# Patient Record
Sex: FEMALE | Race: BLACK OR AFRICAN AMERICAN | NOT HISPANIC OR LATINO | Employment: OTHER | URBAN - METROPOLITAN AREA
[De-identification: names, ages, dates, MRNs, and addresses within clinical notes are randomized per-mention and may not be internally consistent; named-entity substitution may affect disease eponyms.]

---

## 2017-03-09 ENCOUNTER — AMBULATORY - HEALTHEAST (OUTPATIENT)
Dept: PALLIATIVE MEDICINE | Facility: OTHER | Age: 62
End: 2017-03-09

## 2021-06-01 ENCOUNTER — RECORDS - HEALTHEAST (OUTPATIENT)
Dept: ADMINISTRATIVE | Facility: CLINIC | Age: 66
End: 2021-06-01

## 2021-06-02 ENCOUNTER — RECORDS - HEALTHEAST (OUTPATIENT)
Dept: ADMINISTRATIVE | Facility: CLINIC | Age: 66
End: 2021-06-02

## 2021-06-05 ENCOUNTER — RECORDS - HEALTHEAST (OUTPATIENT)
Dept: PALLIATIVE MEDICINE | Facility: OTHER | Age: 66
End: 2021-06-05

## 2021-06-05 DIAGNOSIS — M19.90 OSTEOARTHROSIS: ICD-10-CM

## 2021-06-09 NOTE — PROGRESS NOTES
Discharge Summary      Dates of service 9/6/2016  to 3/9/2017 # Sessions completed: DA ONLY    Diagnosis at Intake  Adjustment disorder, with depressed mood  R/o Depression due to medical condition  R/O Major Depressive Disorder  Chronic Pain Syndrome        Diagnosis at Discharge  Unable to assess        Additional comments: Pt attended diagnostic assessment, did not schedule a follow up appointment.  Pt's file is being closed due to no contact in 3 months.       Reason for discharge:Did not follow up     Prognosis at discharge:unable toassess     Recommendations and referrals at discharge per diagnostic assessment: Encouraged patient to discuss concerns with primary care provider. Patient was discharged from opioid plan of care from [facility] pain clinic and explained to patient that the plan would probably not change. She is on wait list for a different pain clinic. Discussed the option of psychotherapy to help with coping with chronic pain symptoms, she stated she may be interested. Encouraged an increase in her recovery program (AA/NA meetings, sponsorship ect).        Sindy Grajeda      3/9/2017  11:17 AM

## 2024-11-21 ENCOUNTER — HOSPITAL ENCOUNTER (EMERGENCY)
Facility: CLINIC | Age: 69
Discharge: HOME | End: 2024-11-21
Attending: EMERGENCY MEDICINE
Payer: COMMERCIAL

## 2024-11-21 VITALS
TEMPERATURE: 97.4 F | BODY MASS INDEX: 26.68 KG/M2 | OXYGEN SATURATION: 98 % | WEIGHT: 170 LBS | SYSTOLIC BLOOD PRESSURE: 135 MMHG | HEIGHT: 67 IN | HEART RATE: 99 BPM | RESPIRATION RATE: 15 BRPM | DIASTOLIC BLOOD PRESSURE: 92 MMHG

## 2024-11-21 DIAGNOSIS — J06.9 VIRAL UPPER RESPIRATORY TRACT INFECTION: ICD-10-CM

## 2024-11-21 DIAGNOSIS — J02.9 PHARYNGITIS, UNSPECIFIED ETIOLOGY: ICD-10-CM

## 2024-11-21 LAB — GROUP A STREP BY PCR: NOT DETECTED

## 2024-11-21 PROCEDURE — 96372 THER/PROPH/DIAG INJ SC/IM: CPT | Performed by: EMERGENCY MEDICINE

## 2024-11-21 PROCEDURE — 250N000011 HC RX IP 250 OP 636: Performed by: EMERGENCY MEDICINE

## 2024-11-21 PROCEDURE — 87651 STREP A DNA AMP PROBE: CPT | Performed by: EMERGENCY MEDICINE

## 2024-11-21 PROCEDURE — 99284 EMERGENCY DEPT VISIT MOD MDM: CPT

## 2024-11-21 PROCEDURE — 250N000013 HC RX MED GY IP 250 OP 250 PS 637: Performed by: EMERGENCY MEDICINE

## 2024-11-21 PROCEDURE — 250N000009 HC RX 250: Performed by: EMERGENCY MEDICINE

## 2024-11-21 RX ORDER — KETOROLAC TROMETHAMINE 15 MG/ML
30 INJECTION, SOLUTION INTRAMUSCULAR; INTRAVENOUS ONCE
Status: COMPLETED | OUTPATIENT
Start: 2024-11-21 | End: 2024-11-21

## 2024-11-21 RX ORDER — DIPHENHYDRAMINE HYDROCHLORIDE AND LIDOCAINE HYDROCHLORIDE AND ALUMINUM HYDROXIDE AND MAGNESIUM HYDRO
10 KIT EVERY 6 HOURS PRN
Status: DISCONTINUED | OUTPATIENT
Start: 2024-11-21 | End: 2024-11-21 | Stop reason: HOSPADM

## 2024-11-21 RX ORDER — DEXAMETHASONE SODIUM PHOSPHATE 10 MG/ML
10 INJECTION, SOLUTION INTRAMUSCULAR; INTRAVENOUS ONCE
Status: COMPLETED | OUTPATIENT
Start: 2024-11-21 | End: 2024-11-21

## 2024-11-21 RX ADMIN — DIPHENHYDRAMINE HYDROCHLORIDE AND LIDOCAINE HYDROCHLORIDE AND ALUMINUM HYDROXIDE AND MAGNESIUM HYDRO 10 ML: KIT at 03:47

## 2024-11-21 RX ADMIN — DEXAMETHASONE SODIUM PHOSPHATE 10 MG: 10 INJECTION, SOLUTION INTRAMUSCULAR; INTRAVENOUS at 03:24

## 2024-11-21 RX ADMIN — KETOROLAC TROMETHAMINE 30 MG: 15 INJECTION, SOLUTION INTRAMUSCULAR; INTRAVENOUS at 03:24

## 2024-11-21 ASSESSMENT — ACTIVITIES OF DAILY LIVING (ADL)
ADLS_ACUITY_SCORE: 0
ADLS_ACUITY_SCORE: 0

## 2024-11-21 NOTE — ED TRIAGE NOTES
"Patient BIBA from a treatment facility (alcohol abuse hx) for eval of Pharyngitis and a non-productive cough. Pt states this has been going on for \"days\". No fever, chills, N/V/D. VSS. Of note, was seen at [facility] 11/18 and left before being re-eval by MD but RSV/COVID/ Influenza swab was negative.      Triage Assessment (Adult)       Row Name 11/21/24 0306          Triage Assessment    Airway WDL X  Pharyngitis symptoms, redness        Respiratory WDL    Respiratory WDL X;cough     Cough Frequency frequent     Cough Type nonproductive        Skin Circulation/Temperature WDL    Skin Circulation/Temperature WDL WDL        Cardiac WDL    Cardiac WDL X  Hx HTN        Peripheral/Neurovascular WDL    Peripheral Neurovascular WDL WDL        Cognitive/Neuro/Behavioral WDL    Cognitive/Neuro/Behavioral WDL WDL                     "

## 2024-11-21 NOTE — ED NOTES
Bed: ED20  Expected date:   Expected time:   Means of arrival:   Comments:  HEMS 415 69F sore throat, dry cough, stable, eta 0254

## 2024-11-21 NOTE — ED PROVIDER NOTES
"  Emergency Department Note      History of Present Illness     Chief Complaint   Pharyngitis and Cough      HPI   Kriss Vergara is a 69 year old female with a history of COPD, CVA, hyperlipidemia, hypertension, DM2, and polysubstance abuse who presents to the ED for pharyngitis and cough. The patient reports developing a sore throat and cough a couple of days ago. She describes her sore throat as being \"chocked to death\" and feels as though it is closed up. She further reports dysphagia and sleep loss due to her symptoms. She has been taking tylenol for her symptoms, the last ingestion being an hour ago. Patient was seen at [facility] ED recently for the same symptoms. Patient denies any fever, rhinorrhea, or congestion. She further denies any known allergies to medication.    Independent Historian   None    Review of External Notes   I reviewed 11/18/24 [facility] note for viral pharyngitis.    Past Medical History     Medical History and Problem List   Acute encephalopathy  COPD  Community acquired pneumonia  Chronic pain syndrome  Cocaine abuse, last use 7/11/19  Common bile duct dilation  Complete atrioventricular block  Cyst of right kidney  Left drooping eyelid  Heart block  CVA with residual deficit  Hyperlipidemia  Hypokalemia  Insomnia  Leukocytosis  Microalbuminuria  Onychomycosis  Neurosyphilis  DM2  CHACHA  Cervical disc degeneration  Lumbar disc degeneration  Chronic hepatitis C virus  MDD  Hypertension  Opioid dependence with continuous use  Methadone maintenance therapy patient  Polysubstance dependence  Osteoarthritis  Osteoarthrosis   Vitamin D deficiency  Tobacco dependence  Obesity    Medications   Advil/motrin  Tenormin  Lipitor  Cholecalciferol  Celexa  Flexeril  Hydrodiuril  Atarax  Cozaar  Adalat  Klor-con    Surgical History   Past Surgical History:   Procedure Laterality Date    IR CERVICAL EPIDURAL STEROID INJECTION  2/25/2011    IR LUMBAR EPIDURAL STEROID INJECTION  6/11/2009    IR " "LUMBAR PUNCTURE  12/6/2017    [facility] TOTAL ABDOM HYSTERECTOMY      Description: Hysterectomy;  Recorded: 11/14/2011;       Physical Exam     Patient Vitals for the past 24 hrs:   BP Temp Temp src Pulse Resp SpO2 Height Weight   11/21/24 0313 -- -- -- -- -- 98 % -- --   11/21/24 0300 (!) 135/92 -- -- 99 -- 99 % -- --   11/21/24 0257 -- 97.4  F (36.3  C) Oral -- 15 -- 1.702 m (5' 7\") 77.1 kg (170 lb)     Physical Exam  General: Does not appear in acute distress  Head: No signs of trauma.   Mouth/Throat: Oropharynx without swelling or exudate   Eyes: Conjunctivae are normal.   Neck: Normal range of motion. No nuchal rigidity.   CV: Normal rate and regular rhythm.    Resp: Effort normal and breath sounds normal. No respiratory distress.   GI: Soft. There is no tenderness.  No rebound or guarding.  Normal bowel sounds.  MSK: Normal range of motion. no edema. No Calf tenderness.  Neuro: The patient is alert and oriented.   Skin: Skin is warm and dry. No rash noted.   Psych: normal mood and affect. behavior is normal.       Diagnostics     Lab Results   Labs Ordered and Resulted from Time of ED Arrival to Time of ED Departure   GROUP A STREPTOCOCCUS PCR THROAT SWAB - Normal       Result Value    Group A strep by PCR Not Detected         Imaging   No orders to display     Independent Interpretation   None    ED Course      Medications Administered   Medications   magic mouthwash suspension (diphenhydramine, lidocaine, aluminum-magnesium & simethicone) (10 mLs Swish & Swallow $Given 11/21/24 0347)   ketorolac (TORADOL) injection 30 mg (30 mg Intramuscular $Given 11/21/24 0324)   dexAMETHasone (PF) (DECADRON) injectable solution used ORALLY 10 mg (10 mg Oral $Given 11/21/24 0324)       Procedures   Procedures     Discussion of Management   None    ED Course   ED Course as of 11/21/24 0553   Thu Nov 21, 2024   0308 I obtained history and examined the patient as noted above.     0406 I rechecked the patient and discussed " discharge.       Additional Documentation  None    Medical Decision Making / Diagnosis     CMS Diagnoses: None    MIPS       None    MDM   Kriss Vergara is a 69 year old female presents with sore throat, cough, and generally not feeling well.  She had been seen at [facility] for the same complaint and at that time had a reassuring chest x-ray and a negative COVID/influenza swab.  She continued have symptoms and return to the hospital.  On my evaluation she is was breathing comfortably in no respiratory distress.  She had appropriate vital signs and clear lung sounds.  I did obtain a strep swab and this was negative.  She was given the above medications and did feel better.  She is able to tolerate p.o. In this setting I felt that she was appropriate to be discharged back to her treatment facility with additional recommendations for supportive care for what sounds to be a viral process.    Disposition   The patient was discharged.     Diagnosis     ICD-10-CM    1. Pharyngitis, unspecified etiology  J02.9       2. Viral upper respiratory tract infection  J06.9            Discharge Medications   Discharge Medication List as of 11/21/2024  4:08 AM            Scribe Disclosure:  Mavis YUN, am serving as a scribe at 3:20 AM on 11/21/2024 to document services personally performed by Judson Collado MD based on my observations and the provider's statements to me.        Judson Collado MD  11/21/24 0545

## 2024-11-22 ENCOUNTER — HOSPITAL ENCOUNTER (INPATIENT)
Facility: CLINIC | Age: 69
LOS: 3 days | Discharge: HOME | DRG: 683 | End: 2024-11-25
Attending: EMERGENCY MEDICINE | Admitting: INTERNAL MEDICINE
Payer: COMMERCIAL

## 2024-11-22 ENCOUNTER — APPOINTMENT (OUTPATIENT)
Dept: GENERAL RADIOLOGY | Facility: CLINIC | Age: 69
DRG: 683 | End: 2024-11-22
Attending: EMERGENCY MEDICINE
Payer: COMMERCIAL

## 2024-11-22 DIAGNOSIS — R53.1 GENERALIZED WEAKNESS: ICD-10-CM

## 2024-11-22 DIAGNOSIS — E87.6 HYPOKALEMIA: ICD-10-CM

## 2024-11-22 DIAGNOSIS — F10.21 HISTORY OF ALCOHOLISM (H): ICD-10-CM

## 2024-11-22 DIAGNOSIS — M17.10 ARTHROPATHY, LOWER LEG: Primary | ICD-10-CM

## 2024-11-22 DIAGNOSIS — N28.9 ACUTE RENAL INSUFFICIENCY: ICD-10-CM

## 2024-11-22 DIAGNOSIS — J01.10 ACUTE FRONTAL SINUSITIS, RECURRENCE NOT SPECIFIED: ICD-10-CM

## 2024-11-22 LAB
ALBUMIN SERPL BCG-MCNC: 3.7 G/DL (ref 3.5–5.2)
ALBUMIN UR-MCNC: NEGATIVE MG/DL
ALP SERPL-CCNC: 91 U/L (ref 40–150)
ALT SERPL W P-5'-P-CCNC: 49 U/L (ref 0–50)
ANION GAP SERPL CALCULATED.3IONS-SCNC: 13 MMOL/L (ref 7–15)
ANION GAP SERPL CALCULATED.3IONS-SCNC: 14 MMOL/L (ref 7–15)
APPEARANCE UR: CLEAR
AST SERPL W P-5'-P-CCNC: 132 U/L (ref 0–45)
ATRIAL RATE - MUSE: 81 BPM
BASOPHILS # BLD AUTO: 0 10E3/UL (ref 0–0.2)
BASOPHILS NFR BLD AUTO: 0 %
BILIRUB SERPL-MCNC: 0.5 MG/DL
BILIRUB UR QL STRIP: NEGATIVE
BUN SERPL-MCNC: 21.5 MG/DL (ref 8–23)
BUN SERPL-MCNC: 23.4 MG/DL (ref 8–23)
CALCIUM SERPL-MCNC: 8.4 MG/DL (ref 8.8–10.4)
CALCIUM SERPL-MCNC: 8.8 MG/DL (ref 8.8–10.4)
CHLORIDE SERPL-SCNC: 95 MMOL/L (ref 98–107)
CHLORIDE SERPL-SCNC: 98 MMOL/L (ref 98–107)
COLOR UR AUTO: ABNORMAL
CREAT SERPL-MCNC: 1.68 MG/DL (ref 0.51–0.95)
CREAT SERPL-MCNC: 1.79 MG/DL (ref 0.51–0.95)
DIASTOLIC BLOOD PRESSURE - MUSE: NORMAL MMHG
EGFRCR SERPLBLD CKD-EPI 2021: 30 ML/MIN/1.73M2
EGFRCR SERPLBLD CKD-EPI 2021: 32 ML/MIN/1.73M2
EOSINOPHIL # BLD AUTO: 0 10E3/UL (ref 0–0.7)
EOSINOPHIL NFR BLD AUTO: 0 %
ERYTHROCYTE [DISTWIDTH] IN BLOOD BY AUTOMATED COUNT: 14.7 % (ref 10–15)
FLUAV RNA SPEC QL NAA+PROBE: NEGATIVE
FLUBV RNA RESP QL NAA+PROBE: NEGATIVE
GLUCOSE SERPL-MCNC: 108 MG/DL (ref 70–99)
GLUCOSE SERPL-MCNC: 120 MG/DL (ref 70–99)
GLUCOSE UR STRIP-MCNC: NEGATIVE MG/DL
HCO3 SERPL-SCNC: 22 MMOL/L (ref 22–29)
HCO3 SERPL-SCNC: 24 MMOL/L (ref 22–29)
HCT VFR BLD AUTO: 31.4 % (ref 35–47)
HGB BLD-MCNC: 10.5 G/DL (ref 11.7–15.7)
HGB UR QL STRIP: ABNORMAL
IMM GRANULOCYTES # BLD: 0.1 10E3/UL
IMM GRANULOCYTES NFR BLD: 1 %
INTERPRETATION ECG - MUSE: NORMAL
KETONES UR STRIP-MCNC: NEGATIVE MG/DL
LACTATE SERPL-SCNC: 1.7 MMOL/L (ref 0.7–2)
LEUKOCYTE ESTERASE UR QL STRIP: ABNORMAL
LYMPHOCYTES # BLD AUTO: 1.2 10E3/UL (ref 0.8–5.3)
LYMPHOCYTES NFR BLD AUTO: 12 %
MAGNESIUM SERPL-MCNC: 1.2 MG/DL (ref 1.7–2.3)
MCH RBC QN AUTO: 28.2 PG (ref 26.5–33)
MCHC RBC AUTO-ENTMCNC: 33.4 G/DL (ref 31.5–36.5)
MCV RBC AUTO: 84 FL (ref 78–100)
MONOCYTES # BLD AUTO: 0.5 10E3/UL (ref 0–1.3)
MONOCYTES NFR BLD AUTO: 5 %
NEUTROPHILS # BLD AUTO: 7.9 10E3/UL (ref 1.6–8.3)
NEUTROPHILS NFR BLD AUTO: 81 %
NITRATE UR QL: NEGATIVE
NRBC # BLD AUTO: 0 10E3/UL
NRBC BLD AUTO-RTO: 0 /100
P AXIS - MUSE: 37 DEGREES
PH UR STRIP: 5.5 [PH] (ref 5–7)
PHOSPHATE SERPL-MCNC: 2.2 MG/DL (ref 2.5–4.5)
PHOSPHATE SERPL-MCNC: 2.8 MG/DL (ref 2.5–4.5)
PLATELET # BLD AUTO: 123 10E3/UL (ref 150–450)
POTASSIUM SERPL-SCNC: 2.7 MMOL/L (ref 3.4–5.3)
POTASSIUM SERPL-SCNC: 3.2 MMOL/L (ref 3.4–5.3)
PR INTERVAL - MUSE: 174 MS
PROT SERPL-MCNC: 6.8 G/DL (ref 6.4–8.3)
QRS DURATION - MUSE: 160 MS
QT - MUSE: 434 MS
QTC - MUSE: 504 MS
R AXIS - MUSE: 2 DEGREES
RBC # BLD AUTO: 3.72 10E6/UL (ref 3.8–5.2)
RBC URINE: 1 /HPF
RSV RNA SPEC NAA+PROBE: NEGATIVE
SARS-COV-2 RNA RESP QL NAA+PROBE: NEGATIVE
SODIUM SERPL-SCNC: 132 MMOL/L (ref 135–145)
SODIUM SERPL-SCNC: 134 MMOL/L (ref 135–145)
SP GR UR STRIP: 1.01 (ref 1–1.03)
SQUAMOUS EPITHELIAL: 1 /HPF
SYSTOLIC BLOOD PRESSURE - MUSE: NORMAL MMHG
T AXIS - MUSE: 25 DEGREES
TROPONIN T SERPL HS-MCNC: 25 NG/L
TROPONIN T SERPL HS-MCNC: 27 NG/L
TROPONIN T SERPL HS-MCNC: 28 NG/L
TROPONIN T SERPL HS-MCNC: 29 NG/L
UROBILINOGEN UR STRIP-MCNC: NORMAL MG/DL
VENTRICULAR RATE- MUSE: 81 BPM
WBC # BLD AUTO: 9.7 10E3/UL (ref 4–11)
WBC URINE: 1 /HPF

## 2024-11-22 PROCEDURE — 99285 EMERGENCY DEPT VISIT HI MDM: CPT | Mod: 25

## 2024-11-22 PROCEDURE — 84100 ASSAY OF PHOSPHORUS: CPT | Performed by: PHYSICIAN ASSISTANT

## 2024-11-22 PROCEDURE — 84484 ASSAY OF TROPONIN QUANT: CPT | Performed by: EMERGENCY MEDICINE

## 2024-11-22 PROCEDURE — 71046 X-RAY EXAM CHEST 2 VIEWS: CPT

## 2024-11-22 PROCEDURE — 258N000003 HC RX IP 258 OP 636: Performed by: EMERGENCY MEDICINE

## 2024-11-22 PROCEDURE — 250N000011 HC RX IP 250 OP 636: Performed by: PHYSICIAN ASSISTANT

## 2024-11-22 PROCEDURE — HZ2ZZZZ DETOXIFICATION SERVICES FOR SUBSTANCE ABUSE TREATMENT: ICD-10-PCS | Performed by: PHYSICIAN ASSISTANT

## 2024-11-22 PROCEDURE — 99223 1ST HOSP IP/OBS HIGH 75: CPT | Performed by: PHYSICIAN ASSISTANT

## 2024-11-22 PROCEDURE — 81001 URINALYSIS AUTO W/SCOPE: CPT | Performed by: EMERGENCY MEDICINE

## 2024-11-22 PROCEDURE — 83735 ASSAY OF MAGNESIUM: CPT | Performed by: PHYSICIAN ASSISTANT

## 2024-11-22 PROCEDURE — 82435 ASSAY OF BLOOD CHLORIDE: CPT | Performed by: PHYSICIAN ASSISTANT

## 2024-11-22 PROCEDURE — 250N000013 HC RX MED GY IP 250 OP 250 PS 637: Performed by: PHYSICIAN ASSISTANT

## 2024-11-22 PROCEDURE — 120N000001 HC R&B MED SURG/OB

## 2024-11-22 PROCEDURE — 87637 SARSCOV2&INF A&B&RSV AMP PRB: CPT | Performed by: EMERGENCY MEDICINE

## 2024-11-22 PROCEDURE — 80053 COMPREHEN METABOLIC PANEL: CPT | Performed by: EMERGENCY MEDICINE

## 2024-11-22 PROCEDURE — 84484 ASSAY OF TROPONIN QUANT: CPT | Performed by: PHYSICIAN ASSISTANT

## 2024-11-22 PROCEDURE — 83605 ASSAY OF LACTIC ACID: CPT | Performed by: EMERGENCY MEDICINE

## 2024-11-22 PROCEDURE — 36415 COLL VENOUS BLD VENIPUNCTURE: CPT | Performed by: PHYSICIAN ASSISTANT

## 2024-11-22 PROCEDURE — 36415 COLL VENOUS BLD VENIPUNCTURE: CPT | Performed by: EMERGENCY MEDICINE

## 2024-11-22 PROCEDURE — 85025 COMPLETE CBC W/AUTO DIFF WBC: CPT | Performed by: EMERGENCY MEDICINE

## 2024-11-22 PROCEDURE — 258N000003 HC RX IP 258 OP 636: Performed by: PHYSICIAN ASSISTANT

## 2024-11-22 PROCEDURE — 93005 ELECTROCARDIOGRAM TRACING: CPT

## 2024-11-22 PROCEDURE — 82947 ASSAY GLUCOSE BLOOD QUANT: CPT | Performed by: PHYSICIAN ASSISTANT

## 2024-11-22 PROCEDURE — 82565 ASSAY OF CREATININE: CPT | Performed by: PHYSICIAN ASSISTANT

## 2024-11-22 PROCEDURE — 87086 URINE CULTURE/COLONY COUNT: CPT | Performed by: EMERGENCY MEDICINE

## 2024-11-22 RX ORDER — FOLIC ACID 1 MG/1
1 TABLET ORAL DAILY
Status: DISCONTINUED | OUTPATIENT
Start: 2024-11-22 | End: 2024-11-25 | Stop reason: HOSPADM

## 2024-11-22 RX ORDER — ONDANSETRON 2 MG/ML
4 INJECTION INTRAMUSCULAR; INTRAVENOUS EVERY 6 HOURS PRN
Status: DISCONTINUED | OUTPATIENT
Start: 2024-11-22 | End: 2024-11-25 | Stop reason: HOSPADM

## 2024-11-22 RX ORDER — FLUMAZENIL 0.1 MG/ML
0.2 INJECTION, SOLUTION INTRAVENOUS
Status: DISCONTINUED | OUTPATIENT
Start: 2024-11-22 | End: 2024-11-25 | Stop reason: HOSPADM

## 2024-11-22 RX ORDER — ASPIRIN 81 MG/1
81 TABLET, CHEWABLE ORAL DAILY
Status: DISCONTINUED | OUTPATIENT
Start: 2024-11-22 | End: 2024-11-25 | Stop reason: HOSPADM

## 2024-11-22 RX ORDER — NALOXONE HYDROCHLORIDE 0.4 MG/ML
0.2 INJECTION, SOLUTION INTRAMUSCULAR; INTRAVENOUS; SUBCUTANEOUS
Status: DISCONTINUED | OUTPATIENT
Start: 2024-11-22 | End: 2024-11-25 | Stop reason: HOSPADM

## 2024-11-22 RX ORDER — DIAZEPAM 5 MG/1
10 TABLET ORAL EVERY 30 MIN PRN
Status: DISCONTINUED | OUTPATIENT
Start: 2024-11-22 | End: 2024-11-23

## 2024-11-22 RX ORDER — AMOXICILLIN 250 MG
2 CAPSULE ORAL 2 TIMES DAILY PRN
Status: DISCONTINUED | OUTPATIENT
Start: 2024-11-22 | End: 2024-11-25 | Stop reason: HOSPADM

## 2024-11-22 RX ORDER — ACETAMINOPHEN 325 MG/1
325-650 TABLET ORAL EVERY 4 HOURS PRN
COMMUNITY

## 2024-11-22 RX ORDER — MELOXICAM 7.5 MG/1
7.5 TABLET ORAL DAILY
Status: DISCONTINUED | OUTPATIENT
Start: 2024-11-23 | End: 2024-11-25 | Stop reason: HOSPADM

## 2024-11-22 RX ORDER — AMLODIPINE BESYLATE 10 MG/1
10 TABLET ORAL DAILY
Status: DISCONTINUED | OUTPATIENT
Start: 2024-11-23 | End: 2024-11-25 | Stop reason: HOSPADM

## 2024-11-22 RX ORDER — HYDRALAZINE HYDROCHLORIDE 20 MG/ML
10 INJECTION INTRAMUSCULAR; INTRAVENOUS EVERY 4 HOURS PRN
Status: DISCONTINUED | OUTPATIENT
Start: 2024-11-22 | End: 2024-11-25 | Stop reason: HOSPADM

## 2024-11-22 RX ORDER — OLANZAPINE 5 MG/1
5-10 TABLET, ORALLY DISINTEGRATING ORAL EVERY 6 HOURS PRN
Status: DISCONTINUED | OUTPATIENT
Start: 2024-11-22 | End: 2024-11-23

## 2024-11-22 RX ORDER — HYDRALAZINE HYDROCHLORIDE 10 MG/1
10 TABLET, FILM COATED ORAL EVERY 4 HOURS PRN
Status: DISCONTINUED | OUTPATIENT
Start: 2024-11-22 | End: 2024-11-25 | Stop reason: HOSPADM

## 2024-11-22 RX ORDER — ATORVASTATIN CALCIUM 20 MG/1
20 TABLET, FILM COATED ORAL DAILY
Status: DISCONTINUED | OUTPATIENT
Start: 2024-11-23 | End: 2024-11-25 | Stop reason: HOSPADM

## 2024-11-22 RX ORDER — LOSARTAN POTASSIUM 100 MG/1
100 TABLET ORAL DAILY
Status: DISCONTINUED | OUTPATIENT
Start: 2024-11-23 | End: 2024-11-25 | Stop reason: HOSPADM

## 2024-11-22 RX ORDER — ALBUTEROL SULFATE 0.83 MG/ML
2.5 SOLUTION RESPIRATORY (INHALATION)
Status: DISCONTINUED | OUTPATIENT
Start: 2024-11-22 | End: 2024-11-25 | Stop reason: HOSPADM

## 2024-11-22 RX ORDER — MAGNESIUM SULFATE HEPTAHYDRATE 40 MG/ML
4 INJECTION, SOLUTION INTRAVENOUS ONCE
Status: COMPLETED | OUTPATIENT
Start: 2024-11-22 | End: 2024-11-22

## 2024-11-22 RX ORDER — HEPARIN SODIUM 5000 [USP'U]/.5ML
5000 INJECTION, SOLUTION INTRAVENOUS; SUBCUTANEOUS EVERY 8 HOURS
Status: DISCONTINUED | OUTPATIENT
Start: 2024-11-22 | End: 2024-11-25 | Stop reason: HOSPADM

## 2024-11-22 RX ORDER — POLYETHYLENE GLYCOL 3350 17 G/17G
17 POWDER, FOR SOLUTION ORAL 2 TIMES DAILY PRN
Status: DISCONTINUED | OUTPATIENT
Start: 2024-11-22 | End: 2024-11-25 | Stop reason: HOSPADM

## 2024-11-22 RX ORDER — AMOXICILLIN 250 MG
2 CAPSULE ORAL 2 TIMES DAILY
Status: DISCONTINUED | OUTPATIENT
Start: 2024-11-22 | End: 2024-11-25 | Stop reason: HOSPADM

## 2024-11-22 RX ORDER — MULTIPLE VITAMINS W/ MINERALS TAB 9MG-400MCG
1 TAB ORAL DAILY
Status: DISCONTINUED | OUTPATIENT
Start: 2024-11-22 | End: 2024-11-25 | Stop reason: HOSPADM

## 2024-11-22 RX ORDER — ALBUTEROL SULFATE 90 UG/1
2 INHALANT RESPIRATORY (INHALATION) EVERY 4 HOURS PRN
COMMUNITY

## 2024-11-22 RX ORDER — AMOXICILLIN 250 MG
1 CAPSULE ORAL 2 TIMES DAILY
Status: DISCONTINUED | OUTPATIENT
Start: 2024-11-22 | End: 2024-11-25 | Stop reason: HOSPADM

## 2024-11-22 RX ORDER — ASPIRIN 81 MG/1
81 TABLET, CHEWABLE ORAL DAILY
COMMUNITY

## 2024-11-22 RX ORDER — PROCHLORPERAZINE MALEATE 5 MG/1
5 TABLET ORAL EVERY 6 HOURS PRN
Status: DISCONTINUED | OUTPATIENT
Start: 2024-11-22 | End: 2024-11-25 | Stop reason: HOSPADM

## 2024-11-22 RX ORDER — LANOLIN ALCOHOL/MO/W.PET/CERES
1000 CREAM (GRAM) TOPICAL DAILY
Status: DISCONTINUED | OUTPATIENT
Start: 2024-11-23 | End: 2024-11-25 | Stop reason: HOSPADM

## 2024-11-22 RX ORDER — LORAZEPAM 0.5 MG/1
.5-2 TABLET ORAL EVERY 6 HOURS PRN
COMMUNITY

## 2024-11-22 RX ORDER — LANOLIN ALCOHOL/MO/W.PET/CERES
1000 CREAM (GRAM) TOPICAL DAILY
COMMUNITY

## 2024-11-22 RX ORDER — NALOXONE HYDROCHLORIDE 0.4 MG/ML
0.4 INJECTION, SOLUTION INTRAMUSCULAR; INTRAVENOUS; SUBCUTANEOUS
Status: DISCONTINUED | OUTPATIENT
Start: 2024-11-22 | End: 2024-11-25 | Stop reason: HOSPADM

## 2024-11-22 RX ORDER — MELOXICAM 7.5 MG/1
7.5 TABLET ORAL DAILY
COMMUNITY

## 2024-11-22 RX ORDER — FOLIC ACID 1 MG/1
1 TABLET ORAL DAILY
COMMUNITY

## 2024-11-22 RX ORDER — DIAZEPAM 10 MG/2ML
5-10 INJECTION, SOLUTION INTRAMUSCULAR; INTRAVENOUS EVERY 30 MIN PRN
Status: DISCONTINUED | OUTPATIENT
Start: 2024-11-22 | End: 2024-11-23

## 2024-11-22 RX ORDER — SALIVA STIMULANT COMB. NO.3
1 SPRAY, NON-AEROSOL (ML) MUCOUS MEMBRANE
Status: DISCONTINUED | OUTPATIENT
Start: 2024-11-22 | End: 2024-11-25 | Stop reason: HOSPADM

## 2024-11-22 RX ORDER — BENZONATATE 100 MG/1
100-200 CAPSULE ORAL 3 TIMES DAILY PRN
COMMUNITY

## 2024-11-22 RX ORDER — ACETAMINOPHEN 325 MG/1
650 TABLET ORAL EVERY 4 HOURS PRN
Status: DISCONTINUED | OUTPATIENT
Start: 2024-11-22 | End: 2024-11-25 | Stop reason: HOSPADM

## 2024-11-22 RX ORDER — VITAMIN B COMPLEX
25 TABLET ORAL DAILY
Status: DISCONTINUED | OUTPATIENT
Start: 2024-11-23 | End: 2024-11-25 | Stop reason: HOSPADM

## 2024-11-22 RX ORDER — ACETAMINOPHEN 650 MG/1
650 SUPPOSITORY RECTAL EVERY 4 HOURS PRN
Status: DISCONTINUED | OUTPATIENT
Start: 2024-11-22 | End: 2024-11-25 | Stop reason: HOSPADM

## 2024-11-22 RX ORDER — LIDOCAINE 40 MG/G
CREAM TOPICAL
Status: DISCONTINUED | OUTPATIENT
Start: 2024-11-22 | End: 2024-11-25 | Stop reason: HOSPADM

## 2024-11-22 RX ORDER — BENZONATATE 100 MG/1
100 CAPSULE ORAL 3 TIMES DAILY PRN
Status: DISCONTINUED | OUTPATIENT
Start: 2024-11-22 | End: 2024-11-25 | Stop reason: HOSPADM

## 2024-11-22 RX ORDER — SODIUM CHLORIDE, SODIUM LACTATE, POTASSIUM CHLORIDE, CALCIUM CHLORIDE 600; 310; 30; 20 MG/100ML; MG/100ML; MG/100ML; MG/100ML
INJECTION, SOLUTION INTRAVENOUS CONTINUOUS
Status: DISCONTINUED | OUTPATIENT
Start: 2024-11-22 | End: 2024-11-23

## 2024-11-22 RX ORDER — AMOXICILLIN 250 MG
1 CAPSULE ORAL 2 TIMES DAILY PRN
Status: DISCONTINUED | OUTPATIENT
Start: 2024-11-22 | End: 2024-11-25 | Stop reason: HOSPADM

## 2024-11-22 RX ORDER — ONDANSETRON 4 MG/1
4 TABLET, ORALLY DISINTEGRATING ORAL EVERY 6 HOURS PRN
Status: DISCONTINUED | OUTPATIENT
Start: 2024-11-22 | End: 2024-11-25 | Stop reason: HOSPADM

## 2024-11-22 RX ORDER — GUAIFENESIN 200 MG/10ML
200 LIQUID ORAL EVERY 4 HOURS PRN
Status: DISCONTINUED | OUTPATIENT
Start: 2024-11-22 | End: 2024-11-25 | Stop reason: HOSPADM

## 2024-11-22 RX ORDER — POTASSIUM CHLORIDE 1500 MG/1
40 TABLET, EXTENDED RELEASE ORAL ONCE
Status: COMPLETED | OUTPATIENT
Start: 2024-11-22 | End: 2024-11-22

## 2024-11-22 RX ORDER — OXYCODONE HYDROCHLORIDE 5 MG/1
5 TABLET ORAL EVERY 4 HOURS PRN
Status: DISCONTINUED | OUTPATIENT
Start: 2024-11-22 | End: 2024-11-25 | Stop reason: HOSPADM

## 2024-11-22 RX ORDER — AMLODIPINE BESYLATE 10 MG/1
10 TABLET ORAL DAILY
COMMUNITY

## 2024-11-22 RX ORDER — HALOPERIDOL 5 MG/ML
2.5-5 INJECTION INTRAMUSCULAR EVERY 6 HOURS PRN
Status: DISCONTINUED | OUTPATIENT
Start: 2024-11-22 | End: 2024-11-23

## 2024-11-22 RX ADMIN — POTASSIUM CHLORIDE 40 MEQ: 1500 TABLET, EXTENDED RELEASE ORAL at 20:53

## 2024-11-22 RX ADMIN — ASPIRIN 81 MG CHEWABLE TABLET 81 MG: 81 TABLET CHEWABLE at 20:50

## 2024-11-22 RX ADMIN — OXYCODONE HYDROCHLORIDE 2.5 MG: 5 TABLET ORAL at 18:48

## 2024-11-22 RX ADMIN — POTASSIUM & SODIUM PHOSPHATES POWDER PACK 280-160-250 MG 1 PACKET: 280-160-250 PACK at 20:50

## 2024-11-22 RX ADMIN — SODIUM CHLORIDE, POTASSIUM CHLORIDE, SODIUM LACTATE AND CALCIUM CHLORIDE: 600; 310; 30; 20 INJECTION, SOLUTION INTRAVENOUS at 20:59

## 2024-11-22 RX ADMIN — THIAMINE HCL TAB 100 MG 100 MG: 100 TAB at 18:49

## 2024-11-22 RX ADMIN — Medication 1 TABLET: at 18:49

## 2024-11-22 RX ADMIN — MAGNESIUM SULFATE HEPTAHYDRATE 4 G: 4 INJECTION, SOLUTION INTRAVENOUS at 20:49

## 2024-11-22 RX ADMIN — SODIUM CHLORIDE 1000 ML: 9 INJECTION, SOLUTION INTRAVENOUS at 18:14

## 2024-11-22 RX ADMIN — HEPARIN SODIUM 5000 UNITS: 5000 INJECTION, SOLUTION INTRAVENOUS; SUBCUTANEOUS at 18:49

## 2024-11-22 RX ADMIN — FOLIC ACID 1 MG: 1 TABLET ORAL at 18:49

## 2024-11-22 RX ADMIN — SENNOSIDES AND DOCUSATE SODIUM 2 TABLET: 50; 8.6 TABLET ORAL at 20:00

## 2024-11-22 ASSESSMENT — ACTIVITIES OF DAILY LIVING (ADL)
ADLS_ACUITY_SCORE: 0

## 2024-11-22 ASSESSMENT — COLUMBIA-SUICIDE SEVERITY RATING SCALE - C-SSRS
2. HAVE YOU ACTUALLY HAD ANY THOUGHTS OF KILLING YOURSELF IN THE PAST MONTH?: NO
6. HAVE YOU EVER DONE ANYTHING, STARTED TO DO ANYTHING, OR PREPARED TO DO ANYTHING TO END YOUR LIFE?: NO
1. IN THE PAST MONTH, HAVE YOU WISHED YOU WERE DEAD OR WISHED YOU COULD GO TO SLEEP AND NOT WAKE UP?: NO

## 2024-11-22 NOTE — ED PROVIDER NOTES
Emergency Department Note      History of Present Illness     Chief Complaint   Generalized Weakness      SHAN Vergara is a 69 year old female with a history of COPD, substance abuse, hyperlipidemia, type 2 diabetes, and hypertension who presents to the ED with [facility] staff for evaluation of generalized weakness. The patient states she has had a cough, sore throat, and generalized weakness for the past week. She was seen at [facility] ED 4 days ago and here yesterday for these symptoms and was discharged with a diagnosis of a URI. She checked herself into [facility] 4 days ago after being evaluated at [facility] for alcohol abuse treatment. Since her arrival there, she has been increasingly weak and fatigued. She feels she is too weak to care for herself. States she is not expecting family to be in town any time soon. Reports chronic right shoulder pain due to a previous injury in June 2024. She lives independently in an apartment and is able to fully care for herself at baseline. Denies other medical concerns or pain. No recent falls.    Staff from [facility] states she was approved to stay at their facility 4 days ago after being discharged from [facility] prior to being medically cleared for their facility. Since being there, they have found she is unable to climb stairs which is a requirement for residence there. Also notes choking with drinking or eating. She has been reportedly having worsening weakening and difficulties eating and drinking since her arrival there and they do not feel she is medically fit for the facility. She has family coming into town tomorrow but it is unclear whether they with be able to assume care of the patient.    Independent Historian   Assisted Living Facility Staff as detailed above.    Review of External Notes   I reviewed the ED note from [facility] on 11/18/24 and the ED note from [facility] at 0300 yesterday.    Past Medical  History     Medical History and Problem List   COPD  Chronic pain syndrome  Cocaine abuse  Stroke  HLD  Insomnia  Onychomycosis  Syphilis  Type 2 diabetes  Anxiety  DDD  Chronic hepatitis C  Depression  HTN  Opioid dependence  OA     Medications   Amlodipine  Aspirin 81 mg  Coreg  Ativan  Mobic  Robaxin   Omeprazole  Oxycodone  Compazine  Losartan  Atenolol  Lipitor  Celexa  Flexeril  Hydrochlorothiazide  Hydroxyzine   Adalat     Surgical History   Hysterectomy     Physical Exam     Patient Vitals for the past 24 hrs:   BP Temp Temp src Pulse Resp SpO2   11/22/24 1039 119/78 97.4  F (36.3  C) Temporal 96 18 99 %     Physical Exam  Eye:  Pupils are equal, round, and reactive.  Extraocular movements intact.    ENT:  No rhinorrhea.  Moist mucus membranes.  Normal tongue and tonsil.    Cardiac:  Regular rate and rhythm.  No murmurs, gallops, or rubs.    Pulmonary:  Clear to auscultation bilaterally.  No wheezes, rales, or rhonchi.    Abdomen:  Positive bowel sounds.  Abdomen is soft and non-distended, without focal tenderness.    Musculoskeletal:  Normal movement of all extremities without evidence for deficit.    Skin:  Warm and dry without rashes.    Neurologic:  Non-focal exam without asymmetric weakness or numbness.     Psychiatric:  Normal affect with appropriate interaction with examiner.    Diagnostics     Lab Results   Labs Ordered and Resulted from Time of ED Arrival to Time of ED Departure - No data to display    Imaging   No orders to display       EKG   ECG taken at 1040, ECG read at 1050  Atrial-sensed ventricular-paced rhythm    Electronic ventricular pacemaker has replaced Sinus rhythm  as compared to prior, dated 2/28/13.  Rate 98 bpm. MO interval 174 ms. QRS duration 160 ms. QT/QTc 414/528 ms. P-R-T axes 21 27 -11.    Independent Interpretation   CXR: No pneumothorax, infiltrate, or pleural effusion. Pacemaker noted.    ED Course      Medications Administered   Medications - No data to  display    Procedures   Procedures     Discussion of Management   Admitting Hospitalist, Dr. Perez    ED Course   ED Course as of 11/23/24 1240   Fri Nov 22, 2024   1100 I spoke with facility staff that arrived with the patient.   6967 I spoke with AKSHAT Cassidy of the hospitalist service regarding the patient's presentation and plan of care. They accept the patient for admission on behalf of Dr. Perez.       Additional Documentation  None    Medical Decision Making / Diagnosis     CMS Diagnoses: None    MIPS       None    MDM   Kriss Vergara is a 69 year old female with a history of COPD and alcoholism, presenting from a local alcohol treatment facility for increasing weakness, cough, and inability to care for self.  Fortunately, laboratory investigation is generally reassuring.  She has been ruled out for COVID and strep on prior visits this week.  Chest x-ray is clear and she is not hypoxic.  She does show some signs of acute renal insufficiency and hypokalemia.  She feels too weak to return to her personal residence and the rehab center does not feel comfortable caring for her in her current state.  Therefore, we will admit her to the hospitalist team for rehydration, treatment of her COPD, repletion of her electrolytes, and determination of placement.    Disposition   The patient was admitted to the hospital.     Diagnosis     ICD-10-CM    1. Acute renal insufficiency  N28.9       2. Hypokalemia  E87.6       3. Generalized weakness  R53.1       4. History of alcoholism (H)  F10.21            Discharge Medications   New Prescriptions    No medications on file         Scribe Disclosure:  I, Shari Mcghee, am serving as a scribe at 11:07 AM on 11/22/2024 to document services personally performed by Trierweiler, Chad A, MD based on my observations and the provider's statements to me.        Trierweiler, Chad A, MD  11/23/24 1248

## 2024-11-22 NOTE — H&P
[facility]    History and Physical - Hospitalist Service       Date of Admission:  11/22/2024    Assessment & Plan   Kriss Vergara is a 69 year old female with past medical history significant for CVA, COPD, tobacco use disorder, alcoholism currently undergoing treatment, hypertension, hyperlipidemia, CHB status post pacemaker who admitted with upper respiratory infection, acute kidney injury, dehydration electrolyte disturbances elevated troponin and abnormal EKG, prolonged QTc, and significant alcoholism currently receiving alcohol treatment.    URI  Presented with 4-5 days sore throat, cough, generalized weakness. Has been at [facility] for EtOH tx. Seen in [facility] ED recently for the same but left before seeing the provider, she negative for COVID at that time.  Home Home she denies fever or congestion.  [facility] indicated that he was too weak to reside there, she has needed help to go up the stairs and help to get out of a chair due to her generalized weakness.  Therefore they will not take her back.    In the ED she was hemodynamically stable and afebrile.  Labs significant for mild hyponatremia 132, hypokalemia 3.2, acute kidney injury 1.79, hypomagnesemia 1.2.  Troponin elevated at 28.  No leukocytosis.  Hemoglobin 10.5.  UA mildly abnormal but unlikely to be UTI.  Urine culture pending.  COVID, flu, RSV negative.  Chest x-ray without acute pathology.  Strep negative.  EKG a sensed, V paced rhythm, QTc 528 ms, T wave inversions in leads III and aVF as well as V 4 through 6. Due to concerns of injury, electrolyte abnormalities, generalized weakness inhibiting patient from returning back to [facility], and elevated troponin with abnormal EKG the patient was admitted.  -Inpatient status  -Supportive care  -Antitussives, throat spray  -Repeat basic labs 11/23    HUBER   Dehydration with electrolyte disturbances  Reports that he would be much p.o. PTA due to URI and generalized  weakness, feeling unwell and malaise.  Cr baseline 0.8 range. 1.79 on admission consistent with acute kidney injury. Dehydrated, not eating much for past few days. Lytes disturbances, hypokalemia 3.2, mild hyponatremia 132, hypomagnesemia 1.2, and hypophosphatemia 2.2.  -1L bolus NS x2 hours, followed by MIVF  -Check and replete lytes per protocol  -Trend BMP  -If Cr not improving will stop K protocol and pursue further investigations with renal ultrasound etc...  -Trend serum sodium  -Hold PTA ARB  -Avoid hypotension  -Cardiac monitoring    Elevated troponin  Abnormal EKG, improving  Rule out ACS  Denies CP, notes some VARELA but not recent. EKG paced, new TWI inferior leads compared to EKG done 11/18 in care everywhere. Repeat EKG in ED remittent T wave inversions.  Troponin trend 28, 29, 27, 25, which is reassuring.  Likely related to demand ischemia in the setting of above.  If workup remains negative, patient may benefit from outpatient ischemic workup such as stress test.  Risk stratification: cardiac history, gender, age, smoking history, hyperlipidemia, diabetes  -Telemetry  -Trend troponin-flat  -Continue calcium channel blocker with hold parameters  -Hold PTA ARB given HUBER  -PRN IV hydralazine for SBP >180  -Transthoracic echocardiogram to assess for any wall motion  -Given very flat troponin trend and lack of chest pain and other symptoms of ACS we will not start heparin gtt.  -Continue PTA aspirin 81 mg daily, amlodipine 10 mg daily, atorvastatin 20 mg daily  -Check fasting lipid panel and HbA1c     Prolonged QTc  528 ms on initial EKG with electrolyte disturbances as noted above.  Some improvement on repeat EKG.  -Cardiac monitoring  -Avoid QTc prolonging medications  -Check and replete electrolytes, notably magnesium  -Repeat EKG in 11/23    Alcoholism  From [facility] for treatment, they cannot take her back due to level of assistance she needs, generalized weakness. She notes her last alcoholic drink was 2  days prior.  She denies a history of DTs or alcohol withdrawal.  She reports she drinks 2 half pints of vodka per day regularly.  -Chem Dep if still here on Monday  -Check and replete lytes  -MVI, B1 and B9 supplementation  -CIWA protocol with Valium  -PRNs available    Generalized weakness  At [facility] this week and is required assistance with getting into the chair and transferring, unable to walk up stairs independently.  They report they are unable to take her back.  Prior to this hospital stay she was living alone and home given her alcoholism she will need to be connected with some sort of alcohol treatment program on discharge.  -PT evaluation  -SW/CC consultations for discharge planning    Benign essential hypertension  Complete heart block status post pacemaker 6/2024 at [facility]  PTA regimen: Amlodipine 10 mg daily, losartan 100 mg daily  - Continue PTA amlodipine with hold parameters  - Hold PTA losartan in the setting of HUBER  - PRN IV hydralazine available for SBP >180  - Monitor BP trend and need for medication adjustments    Chronic mild normocytic anemia  Baseline appears to be 10-11 range.  At baseline on admission.  No signs of active bleeding.  -Monitor as needed  -Outpatient follow-up    Recent Labs   Lab 11/22/24  1123   HGB 10.5*       Chronic stable diagnoses and other pertinent medical history: Appropriate PTA medications will be resumed.   COPD  CVA 2017: Continue PTA aspirin and statin  Hepatitis C  Vitamin D deficiency  Depression  Anxiety disorder  Osteoarthritis   History of opioid dependence  Tobacco use disorder: As needed nicotine gum/lozenges  GERD  History of type 2 diabetes, last HbA1c of 5.4%, resolved  HLD: Continue PTA statin        Diet: Combination Diet Regular Diet Adult  DVT Prophylaxis: subcutaneous heparin  Adair Catheter: Not present  Lines: None     Cardiac Monitoring: ACTIVE order. Indication: Chest pain/ ACS rule out (24 hours)  Code Status: Full Code    Clinically  "Significant Risk Factors Present on Admission        # Hypokalemia: Lowest K = 2.7 mmol/L in last 2 days, will replace as needed  # Hyponatremia: Lowest Na = 132 mmol/L in last 2 days, will monitor as appropriate  # Hypochloremia: Lowest Cl = 95 mmol/L in last 2 days, will monitor as appropriate  # Hypocalcemia: Lowest Ca = 8.4 mg/dL in last 2 days, will monitor and replace as appropriate   # Hypomagnesemia: Lowest Mg = 1.2 mg/dL in last 2 days, will replace as needed     # Drug Induced Platelet Defect: home medication list includes an antiplatelet medication   # Hypertension: Noted on problem list        # Anemia: based on hgb <11       # Overweight: Estimated body mass index is 25.51 kg/m  as calculated from the following:    Height as of this encounter: 1.702 m (5' 7\").    Weight as of this encounter: 73.9 kg (162 lb 14.4 oz).              Disposition Plan     Medically Ready for Discharge: Anticipated in 2-4 Days once symptoms improved, echo done, monitored on telemetry, lytes repleted, and HUBER improved. And safe disposition found.         The patient's care was discussed with the Attending Physician, Dr. Perez, Bedside Nurse, Patient, and ED Physician .    Stephanie Shea PA-C  Hospitalist Service  [facility]  Securely message with [phone] (more info)  Text page via [facility] Paging/Directory     ______________________________________________________________________    Chief Complaint   Upper respiratory symptoms, generalized weakness    History is obtained from the patient, electronic health record, and emergency department physician    History of Present Illness   Kriss Vergara is a 69 year old female with past medical history significant for CVA, COPD, tobacco use disorder, alcoholism currently undergoing treatment, hypertension, hyperlipidemia, CHB status post pacemaker who presented to the emergency department from [facility] where she is receiving alcohol treatment for sore " throat and cough as well as generalized weakness.  Patient reports [facility] got into [facility] when her symptoms began 4 to 5 days ago.  She was seen in [facility] ED recently for the same but left before seeing the provider, she was of note negative for COVID at that time.  She denies fever or congestion.  [facility] indicated that he was too weak to reside there, she has needed help to go up the stairs and help to get out of a chair due to her generalized weakness.  Therefore they will not take her back.  She notes her last alcoholic drink was 2 days prior.  She denies a history of DTs or alcohol withdrawal.  She reports she drinks 2 half pints of vodka per day regularly.    In the emergency department she was hemodynamically stable and afebrile.  Labs significant for mild hyponatremia 132, hypokalemia 3.2, acute kidney injury 1.79, hypomagnesemia 1.2.  Troponin elevated at 28.  No leukocytosis.  Hemoglobin 10.5.  UA mildly abnormal but unlikely to be UTI.  Urine culture pending.  COVID, flu, RSV negative.  Chest x-ray without acute pathology.  Strep negative.  EKG a sensed, V paced rhythm, QTc 528 ms, T wave inversions in leads III and aVF as well as V 4 through 6.  Due to concerns of injury, electrolyte abnormalities, generalized weakness inhibiting patient from returning back to [facility], and elevated troponin with abnormal EKG the patient was admitted.    Past Medical History    No past medical history on file.    Past Surgical History   Past Surgical History:   Procedure Laterality Date    IR CERVICAL EPIDURAL STEROID INJECTION  2/25/2011    IR LUMBAR EPIDURAL STEROID INJECTION  6/11/2009    IR LUMBAR PUNCTURE  12/6/2017    [facility] TOTAL ABDOM HYSTERECTOMY      Description: Hysterectomy;  Recorded: 11/14/2011;       Prior to Admission Medications   Prior to Admission Medications   Prescriptions Last Dose Informant Patient Reported? Taking?   LORazepam (ATIVAN) 0.5 MG tablet 11/22/2024  Yes Yes    Sig: Take 0.5-2 mg by mouth every 6 hours as needed for withdrawal.   Vitamin D3 (CHOLECALCIFEROL) 25 mcg (1000 units) tablet 11/22/2024 Morning  Yes Yes   Sig: Take 1 tablet by mouth daily.   acetaminophen (TYLENOL) 325 MG tablet   Yes Yes   Sig: Take 325-650 mg by mouth every 4 hours as needed for mild pain.   albuterol (PROAIR HFA/PROVENTIL HFA/VENTOLIN HFA) 108 (90 Base) MCG/ACT inhaler   Yes Yes   Sig: Inhale 2 puffs into the lungs every 4 hours as needed for shortness of breath or wheezing.   amLODIPine (NORVASC) 10 MG tablet 11/22/2024 Morning  Yes Yes   Sig: Take 10 mg by mouth daily.   aspirin (ASA) 81 MG chewable tablet Unknown  Yes Yes   Sig: Take 81 mg by mouth daily.   atorvastatin (LIPITOR) 20 MG tablet 11/22/2024 Morning  Yes Yes   Sig: Take 20 mg by mouth daily.   benzonatate (TESSALON) 100 MG capsule Past Week  Yes Yes   Sig: Take 100-200 mg by mouth 3 times daily as needed for cough.   cyanocobalamin (VITAMIN B-12) 1000 MCG tablet 11/22/2024 Morning  Yes Yes   Sig: Take 1,000 mcg by mouth daily.   folic acid (FOLVITE) 1 MG tablet 11/22/2024 Morning  Yes Yes   Sig: Take 1 mg by mouth daily.   losartan (COZAAR) 100 MG tablet 11/22/2024 Morning  Yes Yes   Sig: Take 100 mg by mouth daily.   meloxicam (MOBIC) 7.5 MG tablet 11/22/2024 Morning  Yes Yes   Sig: Take 7.5 mg by mouth daily.   nicotine (NICORETTE) 2 MG gum   Yes Yes   Sig: Place 2 mg inside cheek every hour as needed for nicotine withdrawal symptoms.      Facility-Administered Medications: None        Review of Systems    The 10 point Review of Systems is negative other than noted in the HPI or here.     Social History   I have reviewed this patient's social history and updated it with pertinent information if needed.  Social History     Substance Use Topics    Alcohol use: Yes     Comment: last drank on monday, was drinking 2 pints of hard ETOH per day.         Allergies   Allergies   Allergen Reactions    Codeine Unknown        Physical  Exam   Vital Signs: Temp: 97.6  F (36.4  C) Temp src: Oral BP: 135/89 Pulse: 81   Resp: 16 SpO2: 99 % O2 Device: None (Room air)    Weight: 162 lbs 14.4 oz    Physical Exam    General: Awake, alert, very pleasant woman who appears younger than stated age.   HEENT: Normocephalic, atraumatic. Extraocular movements intact.   Respiratory: Clear to auscultation bilaterally, no rales, wheezing, or rhonchi.  Cardiovascular: Regular rate and rhythm, +S1 and S2, no murmur auscultated. No peripheral edema.   Gastrointestinal: Soft, non-tender, non-distended. Bowel sounds present.  Skin: Warm, dry. No obvious rashes or lesions on exposed skin. Dorsalis pedis pulses palpable bilaterally.  Musculoskeletal: No joint swelling, erythema or tenderness. Moves all extremities equally.  Neurologic: AAO x3.   Psychiatric: Appropriate mood and affect. No obvious anxiety or depression.      Medical Decision Making       >60 MINUTES SPENT BY ME on the date of service doing chart review, history, exam, documentation & further activities per the note.      Data     I have personally reviewed the following data over the past 24 hrs:    9.7  \   10.5 (L)   / 123 (L)     134 (L) 98 23.4 (H) /  108 (H)   2.7 (L) 22 1.68 (H) \     ALT: 49 AST: 132 (H) AP: 91 TBILI: 0.5   ALB: 3.7 TOT PROTEIN: 6.8 LIPASE: N/A     Trop: 25 (H) BNP: N/A     Procal: N/A CRP: N/A Lactic Acid: 1.7         Imaging results reviewed over the past 24 hrs:   Recent Results (from the past 24 hours)   XR Chest 2 Views    Narrative    XR CHEST 2 VIEWS   11/22/2024 11:33 AM     HISTORY: Cough.    COMPARISON: None.      Impression    IMPRESSION: No acute cardiopulmonary disease. Left chest pacer.    UNA STERN MD         SYSTEM ID:  [id number]

## 2024-11-22 NOTE — ED TRIAGE NOTES
Pt presents to ED with staff from [facility].  Pt reports she has felt weak for the last 2 days.  Per documentation brought by staff, staff at [facility] of concerns of a UTI, ETOH withdrawal,or crdiac issues.

## 2024-11-22 NOTE — PROGRESS NOTES
Hospitalist brief    Here with URI, HUBER, and ACS rule out. Orders placed for admission, full note to follow.    SUDHEER Pereira, PADevC  Hospitalist MIN  [facility]  Securely message with the [employer] (learn more here)  Text page via [facility] Paging/Directory

## 2024-11-22 NOTE — PROGRESS NOTES
RECEIVING UNIT ED HANDOFF REVIEW    ED Nurse Handoff Report was reviewed by: Анна Mitchell RN on November 22, 2024 at 5:40 PM

## 2024-11-22 NOTE — ED NOTES
[facility]  ED Nurse Handoff Report    ED Chief complaint: Generalized Weakness      ED Diagnosis:   Final diagnoses:   Acute renal insufficiency   Hypokalemia   Generalized weakness   History of alcoholism (H)       Code Status: hospitalist to address    Allergies:   Allergies   Allergen Reactions    Codeine Unknown       Patient Story: patient presents to ED with a staff member from the [facility]. Patient checked herself into [facility] for alcoholism on Monday. Since Monday patient has been having URI s/sx (had been seen 2X this week and had negative covid and negative strep testing), trouble eating and drinking and generalized weakness. Patient having difficulty ambulating and utilizing the stairs at the [facility]. Patient too medically complex for further care at [facility] per staff. Patient lives alone normally but staff felt it was unsafe for patient to go back home alone. Patient has cardiac hx, has pacemaker. Patient has slightly elevated troponin in ED.   Being admitted for: positive troponin, weakness, renal insufficiency and hypokalemia.  Focused Assessment:  see above    Treatments and/or interventions provided: see MAR  Patient's response to treatments and/or interventions:     To be done/followed up on inpatient unit:  remaining inpatient orders    Does this patient have any cognitive concerns?:  Not currently- alert and oriented    Activity level - Baseline/Home:  [facility]  Activity Level - Current:   Stand with Assist, Cane, Walker, and Wheelchair    Patient's Preferred language: English   Needed?: No    Isolation: None  Infection: Not Applicable  Patient tested for COVID 19 prior to admission: NO  Bariatric?: No    Vital Signs:   Vitals:    11/22/24 1039   BP: 119/78   Pulse: 96   Resp: 18   Temp: 97.4  F (36.3  C)   TempSrc: Temporal   SpO2: 99%       Cardiac Rhythm:     Was the PSS-3 completed:   unknown  What interventions are required if any?                Family Comments:   OBS brochure/video discussed/provided to patient/family: No              Name of person given brochure if not patient:               Relationship to patient:     For the majority of the shift this patient's behavior was Green.   Behavioral interventions performed were .    ED NURSE PHONE NUMBER: [phone]

## 2024-11-22 NOTE — PHARMACY-ADMISSION MEDICATION HISTORY
Pharmacist Admission Medication History    Admission medication history is complete. The information provided in this note is only as accurate as the sources available at the time of the update.    Information Source(s): Patient and Facility (TCU/NH/) medication list/MAR via in-person and phone    Pertinent Information: Spoke with Grecia Ferro about medications patient was on. Did not have vitamin D or asa on list - but patient states she was suppose to be taking    Changes made to PTA medication list:  Added: amlodipine, ativan, meloxicam, asa, vitamin b12, folic acid, nicotine gum  Deleted: nifedipine, potassium, atenolol, citalpram, fexeril, hydrochlorothiazide, ibuprofen  Changed: None    Allergies reviewed with patient and updates made in EHR: no    Medication History Completed By: Fabiola Gallegos RPH 11/22/2024 5:06 PM    PTA Med List   Medication Sig Last Dose/Taking    acetaminophen (TYLENOL) 325 MG tablet Take 325-650 mg by mouth every 4 hours as needed for mild pain. Taking As Needed    albuterol (PROAIR HFA/PROVENTIL HFA/VENTOLIN HFA) 108 (90 Base) MCG/ACT inhaler Inhale 2 puffs into the lungs every 4 hours as needed for shortness of breath or wheezing. Taking As Needed    amLODIPine (NORVASC) 10 MG tablet Take 10 mg by mouth daily. 11/22/2024 Morning    aspirin (ASA) 81 MG chewable tablet Take 81 mg by mouth daily. Unknown    atorvastatin (LIPITOR) 20 MG tablet Take 20 mg by mouth daily. 11/22/2024 Morning    benzonatate (TESSALON) 100 MG capsule Take 100-200 mg by mouth 3 times daily as needed for cough. Past Week    cyanocobalamin (VITAMIN B-12) 1000 MCG tablet Take 1,000 mcg by mouth daily. 11/22/2024 Morning    folic acid (FOLVITE) 1 MG tablet Take 1 mg by mouth daily. 11/22/2024 Morning    LORazepam (ATIVAN) 0.5 MG tablet Take 0.5-2 mg by mouth every 6 hours as needed for withdrawal. 11/22/2024    losartan (COZAAR) 100 MG tablet Take 100 mg by mouth daily. 11/22/2024 Morning    meloxicam (MOBIC)  7.5 MG tablet Take 7.5 mg by mouth daily. 11/22/2024 Morning    nicotine (NICORETTE) 2 MG gum Place 2 mg inside cheek every hour as needed for nicotine withdrawal symptoms. Taking As Needed    Vitamin D3 (CHOLECALCIFEROL) 25 mcg (1000 units) tablet Take 1 tablet by mouth daily. 11/22/2024 Morning

## 2024-11-22 NOTE — ED NOTES
Staff from [facility] states that due to patient's medical conditions she is not able to return to their facility after ED visit. Staff reports patient does not have family coming into town until tomorrow. Staff from [facility] requesting placement for patient.

## 2024-11-23 LAB
ALBUMIN SERPL BCG-MCNC: 3.5 G/DL (ref 3.5–5.2)
ALP SERPL-CCNC: 98 U/L (ref 40–150)
ALT SERPL W P-5'-P-CCNC: 50 U/L (ref 0–50)
ANION GAP SERPL CALCULATED.3IONS-SCNC: 11 MMOL/L (ref 7–15)
AST SERPL W P-5'-P-CCNC: 116 U/L (ref 0–45)
ATRIAL RATE - MUSE: 98 BPM
BACTERIA UR CULT: NORMAL
BILIRUB SERPL-MCNC: 0.3 MG/DL
BUN SERPL-MCNC: 18.5 MG/DL (ref 8–23)
CALCIUM SERPL-MCNC: 9.1 MG/DL (ref 8.8–10.4)
CHLORIDE SERPL-SCNC: 102 MMOL/L (ref 98–107)
CHOLEST SERPL-MCNC: 133 MG/DL
CREAT SERPL-MCNC: 1.23 MG/DL (ref 0.51–0.95)
DIASTOLIC BLOOD PRESSURE - MUSE: NORMAL MMHG
EGFRCR SERPLBLD CKD-EPI 2021: 47 ML/MIN/1.73M2
ERYTHROCYTE [DISTWIDTH] IN BLOOD BY AUTOMATED COUNT: 15.1 % (ref 10–15)
EST. AVERAGE GLUCOSE BLD GHB EST-MCNC: 137 MG/DL
GLUCOSE SERPL-MCNC: 106 MG/DL (ref 70–99)
HBA1C MFR BLD: 6.4 %
HCO3 SERPL-SCNC: 24 MMOL/L (ref 22–29)
HCT VFR BLD AUTO: 31.7 % (ref 35–47)
HDLC SERPL-MCNC: 89 MG/DL
HGB BLD-MCNC: 10.5 G/DL (ref 11.7–15.7)
INTERPRETATION ECG - MUSE: NORMAL
LDLC SERPL CALC-MCNC: 31 MG/DL
MAGNESIUM SERPL-MCNC: 2.1 MG/DL (ref 1.7–2.3)
MAGNESIUM SERPL-MCNC: 2.5 MG/DL (ref 1.7–2.3)
MCH RBC QN AUTO: 28.7 PG (ref 26.5–33)
MCHC RBC AUTO-ENTMCNC: 33.1 G/DL (ref 31.5–36.5)
MCV RBC AUTO: 87 FL (ref 78–100)
NONHDLC SERPL-MCNC: 44 MG/DL
P AXIS - MUSE: 21 DEGREES
PHOSPHATE SERPL-MCNC: 3.7 MG/DL (ref 2.5–4.5)
PLATELET # BLD AUTO: 106 10E3/UL (ref 150–450)
POTASSIUM SERPL-SCNC: 3.2 MMOL/L (ref 3.4–5.3)
POTASSIUM SERPL-SCNC: 3.7 MMOL/L (ref 3.4–5.3)
POTASSIUM SERPL-SCNC: 3.7 MMOL/L (ref 3.4–5.3)
PR INTERVAL - MUSE: 174 MS
PROT SERPL-MCNC: 6.3 G/DL (ref 6.4–8.3)
QRS DURATION - MUSE: 160 MS
QT - MUSE: 414 MS
QTC - MUSE: 528 MS
R AXIS - MUSE: 27 DEGREES
RBC # BLD AUTO: 3.66 10E6/UL (ref 3.8–5.2)
SODIUM SERPL-SCNC: 137 MMOL/L (ref 135–145)
SYSTOLIC BLOOD PRESSURE - MUSE: NORMAL MMHG
T AXIS - MUSE: -11 DEGREES
TRIGL SERPL-MCNC: 65 MG/DL
VENTRICULAR RATE- MUSE: 98 BPM
WBC # BLD AUTO: 5.5 10E3/UL (ref 4–11)

## 2024-11-23 PROCEDURE — 80061 LIPID PANEL: CPT | Performed by: PHYSICIAN ASSISTANT

## 2024-11-23 PROCEDURE — 85018 HEMOGLOBIN: CPT | Performed by: PHYSICIAN ASSISTANT

## 2024-11-23 PROCEDURE — 93010 ELECTROCARDIOGRAM REPORT: CPT | Performed by: INTERNAL MEDICINE

## 2024-11-23 PROCEDURE — 250N000011 HC RX IP 250 OP 636: Performed by: PHYSICIAN ASSISTANT

## 2024-11-23 PROCEDURE — 36415 COLL VENOUS BLD VENIPUNCTURE: CPT | Performed by: PHYSICIAN ASSISTANT

## 2024-11-23 PROCEDURE — 83036 HEMOGLOBIN GLYCOSYLATED A1C: CPT | Performed by: PHYSICIAN ASSISTANT

## 2024-11-23 PROCEDURE — 250N000013 HC RX MED GY IP 250 OP 250 PS 637: Performed by: PHYSICIAN ASSISTANT

## 2024-11-23 PROCEDURE — 250N000013 HC RX MED GY IP 250 OP 250 PS 637: Performed by: STUDENT IN AN ORGANIZED HEALTH CARE EDUCATION/TRAINING PROGRAM

## 2024-11-23 PROCEDURE — 93005 ELECTROCARDIOGRAM TRACING: CPT

## 2024-11-23 PROCEDURE — 250N000013 HC RX MED GY IP 250 OP 250 PS 637: Performed by: INTERNAL MEDICINE

## 2024-11-23 PROCEDURE — 83735 ASSAY OF MAGNESIUM: CPT | Performed by: INTERNAL MEDICINE

## 2024-11-23 PROCEDURE — 84100 ASSAY OF PHOSPHORUS: CPT | Performed by: INTERNAL MEDICINE

## 2024-11-23 PROCEDURE — 85041 AUTOMATED RBC COUNT: CPT | Performed by: PHYSICIAN ASSISTANT

## 2024-11-23 PROCEDURE — 84132 ASSAY OF SERUM POTASSIUM: CPT | Performed by: INTERNAL MEDICINE

## 2024-11-23 PROCEDURE — 99232 SBSQ HOSP IP/OBS MODERATE 35: CPT | Performed by: INTERNAL MEDICINE

## 2024-11-23 PROCEDURE — 83735 ASSAY OF MAGNESIUM: CPT | Performed by: PHYSICIAN ASSISTANT

## 2024-11-23 PROCEDURE — 84132 ASSAY OF SERUM POTASSIUM: CPT | Performed by: PHYSICIAN ASSISTANT

## 2024-11-23 PROCEDURE — 36415 COLL VENOUS BLD VENIPUNCTURE: CPT | Performed by: INTERNAL MEDICINE

## 2024-11-23 PROCEDURE — 120N000001 HC R&B MED SURG/OB

## 2024-11-23 RX ORDER — POTASSIUM CHLORIDE 1500 MG/1
40 TABLET, EXTENDED RELEASE ORAL ONCE
Status: COMPLETED | OUTPATIENT
Start: 2024-11-23 | End: 2024-11-23

## 2024-11-23 RX ORDER — MAGNESIUM HYDROXIDE/ALUMINUM HYDROXICE/SIMETHICONE 120; 1200; 1200 MG/30ML; MG/30ML; MG/30ML
30 SUSPENSION ORAL EVERY 4 HOURS PRN
Status: DISCONTINUED | OUTPATIENT
Start: 2024-11-23 | End: 2024-11-25 | Stop reason: HOSPADM

## 2024-11-23 RX ADMIN — ALUMINUM HYDROXIDE, MAGNESIUM HYDROXIDE, AND SIMETHICONE 30 ML: 200; 200; 20 SUSPENSION ORAL at 11:40

## 2024-11-23 RX ADMIN — ACETAMINOPHEN 650 MG: 325 TABLET, FILM COATED ORAL at 03:58

## 2024-11-23 RX ADMIN — MICONAZOLE NITRATE: 2 POWDER TOPICAL at 14:07

## 2024-11-23 RX ADMIN — OXYCODONE HYDROCHLORIDE 2.5 MG: 5 TABLET ORAL at 00:37

## 2024-11-23 RX ADMIN — ACETAMINOPHEN 650 MG: 325 TABLET, FILM COATED ORAL at 08:12

## 2024-11-23 RX ADMIN — POTASSIUM & SODIUM PHOSPHATES POWDER PACK 280-160-250 MG 1 PACKET: 280-160-250 PACK at 04:24

## 2024-11-23 RX ADMIN — AMLODIPINE BESYLATE 10 MG: 10 TABLET ORAL at 08:12

## 2024-11-23 RX ADMIN — BENZONATATE 100 MG: 100 CAPSULE ORAL at 11:40

## 2024-11-23 RX ADMIN — Medication 1 TABLET: at 08:12

## 2024-11-23 RX ADMIN — FOLIC ACID 1 MG: 1 TABLET ORAL at 08:12

## 2024-11-23 RX ADMIN — ASPIRIN 81 MG CHEWABLE TABLET 81 MG: 81 TABLET CHEWABLE at 08:12

## 2024-11-23 RX ADMIN — ATORVASTATIN CALCIUM 20 MG: 20 TABLET, FILM COATED ORAL at 08:12

## 2024-11-23 RX ADMIN — HEPARIN SODIUM 5000 UNITS: 5000 INJECTION, SOLUTION INTRAVENOUS; SUBCUTANEOUS at 11:40

## 2024-11-23 RX ADMIN — ACETAMINOPHEN 650 MG: 325 TABLET, FILM COATED ORAL at 16:38

## 2024-11-23 RX ADMIN — OXYCODONE HYDROCHLORIDE 2.5 MG: 5 TABLET ORAL at 11:40

## 2024-11-23 RX ADMIN — SENNOSIDES AND DOCUSATE SODIUM 1 TABLET: 50; 8.6 TABLET ORAL at 20:37

## 2024-11-23 RX ADMIN — MICONAZOLE NITRATE: 2 POWDER TOPICAL at 20:39

## 2024-11-23 RX ADMIN — POTASSIUM & SODIUM PHOSPHATES POWDER PACK 280-160-250 MG 1 PACKET: 280-160-250 PACK at 00:27

## 2024-11-23 RX ADMIN — Medication 25 MCG: at 08:12

## 2024-11-23 RX ADMIN — CYANOCOBALAMIN TAB 1000 MCG 1000 MCG: 1000 TAB at 08:12

## 2024-11-23 RX ADMIN — HEPARIN SODIUM 5000 UNITS: 5000 INJECTION, SOLUTION INTRAVENOUS; SUBCUTANEOUS at 03:34

## 2024-11-23 RX ADMIN — ALUMINUM HYDROXIDE, MAGNESIUM HYDROXIDE, AND SIMETHICONE 30 ML: 200; 200; 20 SUSPENSION ORAL at 04:24

## 2024-11-23 RX ADMIN — BENZONATATE 100 MG: 100 CAPSULE ORAL at 16:40

## 2024-11-23 RX ADMIN — OXYCODONE HYDROCHLORIDE 2.5 MG: 5 TABLET ORAL at 04:24

## 2024-11-23 RX ADMIN — MELOXICAM 7.5 MG: 7.5 TABLET ORAL at 08:17

## 2024-11-23 RX ADMIN — THIAMINE HCL TAB 100 MG 100 MG: 100 TAB at 08:12

## 2024-11-23 RX ADMIN — OXYCODONE HYDROCHLORIDE 2.5 MG: 5 TABLET ORAL at 20:37

## 2024-11-23 RX ADMIN — HEPARIN SODIUM 5000 UNITS: 5000 INJECTION, SOLUTION INTRAVENOUS; SUBCUTANEOUS at 20:40

## 2024-11-23 RX ADMIN — POTASSIUM CHLORIDE 40 MEQ: 1500 TABLET, EXTENDED RELEASE ORAL at 03:34

## 2024-11-23 NOTE — PLAN OF CARE
Goal Outcome Evaluation:                      Summary: ETOH, hypokalemia, increased weakness    DATE & TIME: 11/22/24 0930-1794    Cognitive Concerns/ Orientation: A&O x4   BEHAVIOR & AGGRESSION TOOL COLOR: Green  CIWA SCORE: 0   ABNL VS/O2: VSS on RA,   MOBILITY: SBA d/t increased weakness  PAIN MANAGMENT: C/O generalized pain, managed with PRN Oxycodone/Tylenol. Maalox for GI  DIET: Regular  BOWEL/BLADDER: Cont. B&B.  ABNL LAB/BG: Troponin 29,27,25. ; K 3.2, replaced x 2, a.m. recheck. Phos 2.2, replaced, AM recheck. Mg 1.2 replaced, recheck was 2.5. ; Cr 1.79; ; PLT 123k.  A.m. labs will be drawn at 0730  DRAIN/DEVICES: R PIV LR @ 100 mL/hr TELEMETRY RHYTHM: 100% paced  SKIN: scattered scabs and bruises, reddened breast folds  TESTS/PROCEDURES: Echo ordered  D/C DAY/GOALS/PLACE: pending  OTHER IMPORTANT INFO: PT consulted. [facility] (addiction treatment center)  will not be able to take pt back due to acuity.Requests SW get involved. Pt is not aware they are unable to take her back.

## 2024-11-23 NOTE — PROVIDER NOTIFICATION
MD Notification    Notified Person: MD    Notified Person Name: Dr. Shea    Notification Date/Time:  11/22/24 1801    Notification Interaction: Vocera message    Purpose of Notification: Patient just arrived to the floor. There is a NS Bolus from 1400 not given yet and an LR maintenance. Can you clarify?    MD: Has she received any fluids yet?    RN: She has not yet...    MD: She needs the bolus followed by [facility] fluids    RN: Bolus says 1,000 mL over one hour?    MD: Ok to give over 2 and then hang LR    Orders Received:    Comments:

## 2024-11-23 NOTE — CONSULTS
"BRIEF NUTRITION ASSESSMENT      REASON FOR ASSESSMENT:  Kriss Vergara is a 69 year old female seen by Registered Dietitian for Admission Nutrition Risk Screen for Have you recently lost weight without trying? - Yes 14-23#  Have you eaten poorly because of a decreased appetite? - Yes       NUTRITION HISTORY:  Visited with patient and son at bedside.  She notes that she has missing teeth which sometimes makes it hard to eat.  Appetite has been down recently but \"it's getting better\".   Has used Ensure in the distant past and willing to try again - \"to get my strength up\".    CURRENT DIET AND INTAKE:  Diet:  Regular               Patient notes that she ate 100% of her breakfast and lunch today     Breakfast = Egg sandwich, home fried potatoes, coffee, and apple juice    Lunch = Turkey, mashed potatoes, broccoli, ice cream x 2     ANTHROPOMETRICS:  Height: 5' 7\"  Weight:  73.9 kg (163#)(11/22)  Body mass index is 25.51 kg/m   Weight Status: Overweight BMI 25-29.9  IBW:  61.4 kg   %IBW: 120%  Weight History:   Wt Readings from Last 10 Encounters:   11/22/24 73.9 kg (162 lb 14.4 oz)   11/21/24 77.1 kg (170 lb)     Per Care Everywhere = 171# 7/10/24 and 169# 6/17/24  Down 6# or 3.5% in 5 months     LABS:  Labs noted    MALNUTRITION:  Visual Nutrition Focused Physical Assessment completed - no muscle/fat losses noted.  Patient does not meet two of the following criteria necessary for diagnosing malnutrition.     % Weight Loss:  Weight loss does not meet criteria for malnutrition  % Intake:  Decreased intake does not meet criteria for malnutrition (eating 100% of meals now, much improved)  Subcutaneous Fat Loss:  None observed  Muscle Loss:  None observed  Fluid Retention:  None noted    NUTRITION INTERVENTION:  Nutrition Diagnosis:  No nutrition diagnosis at this time.    Implementation:  Nutrition Education:  Per Provider order if indicated  Ensure BID between meals per patient request     FOLLOW UP/MONITORING:   Will " re-evaluate in 7 - 10 days, or sooner, if re-consulted.    Julieta Moore RD, LD, CNSC   Clinical Dietitian - [facility]

## 2024-11-23 NOTE — PROGRESS NOTES
[facility]    Hospitalist Progress Note    Interval History   Still feels weak, ongoing cough no sputum, trying to increase her oral intake. Endorses mild SOB from coughing, denies chest pain. Took a shower this morning    Assessment & Plan   Summary: Kriss Vergara is a 69 year old female with PMH CVA, COPD, tobacco use disorder, alcoholism currently undergoing treatment, hypertension, hyperlipidemia, CHB status post pacemaker who was admitted on 11/22/2024 with URI and acute kidney injury.    Upper respiratory infection  Presented with 4-5 days sore throat, cough, generalized weakness. Has been at [facility] for EtOH tx. Seen in [facility] ED recently for the same but left before seeing the provider, she negative for COVID at that time.  Denies fever or congestion.  [place] indicated that he was too weak to reside there, she has needed help to go up the stairs and help to get out of a chair due to her generalized weakness.  Therefore they will not take her back.    ED workup noted for mild hyponatremia, hypokalemia, HUBER, hypomagnesemia. No leukocytosis.  Chest x-ray without acute pathology.  Strep negative. Due to concerns of injury, electrolyte abnormalities, generalized weakness inhibiting patient from returning back to provide, and elevated troponin with abnormal EKG the patient was admitted.  - Supportive care  - Antitussives, throat spray    HUBER   Dehydration with electrolyte disturbances  Cr baseline 0.8 range. 1.79 on admission consistent with acute kidney injury. Dehydrated, not eating much for past few days. Cr improving, last 1.68  - Recheck BMP pending  - Hold PTA ARB    Elevated troponin, likely demand ischemia due to hypovolemia  Abnormal EKG, improving  Rule out ACS  Denies CP, notes some VARELA but not recent. EKG paced, new TWI inferior leads compared to EKG done 11/18 in care everywhere. Repeat EKG in ED remittent T wave inversions. Risk stratification: cardiac  history, gender, age, smoking history, hyperlipidemia, diabetes  - Patient may benefit from outpatient ischemic workup such as stress test.  - Continue calcium channel blocker with hold parameters  - Hold PTA ARB given HUBER  - PRN IV hydralazine for SBP >180  - Transthoracic echocardiogram to assess for any wall motion  - Continue PTA aspirin 81 mg daily, amlodipine 10 mg daily, atorvastatin 20 mg daily  - Check fasting lipid panel and HbA1c --pending    Prolonged QTc  528 ms on initial EKG with electrolyte disturbances as noted above.  Some improvement on repeat EKG. Improved to 488msec on 11/23  - Repeat EKG in 11/24    Alcohol use disorder  From [place] for treatment, they cannot take her back due to level of assistance she needs, generalized weakness. She notes her last alcoholic drink was 2 days prior.  She denies a history of DTs or alcohol withdrawal.  She reports she drinks 2 half pints of vodka per day regularly.  - Chem Dep if still here on Monday  - Check and replete lytes  - MVI, B1 and B9 supplementation  - Stop CIWA, no withdawal this hospital stay    Generalized weakness  At provide this week and is required assistance with getting into the chair and transferring, unable to walk up stairs independently.  They report they are unable to take her back.  Prior to this hospital stay she was living alone and home given her alcoholism she will need to be connected with some sort of alcohol treatment program on discharge.  - PT evaluation  - SW/CC consultations for discharge planning    Benign essential hypertension  Complete heart block status post pacemaker 6/2024 at [facility]  PTA regimen: Amlodipine 10 mg daily, losartan 100 mg daily  - Continue PTA amlodipine with hold parameters  - Hold PTA losartan in the setting of HUBER  - PRN IV hydralazine available for SBP >180  - Monitor BP trend and need for medication adjustments    Chronic mild normocytic anemia  Baseline appears to be 10-11 range.  At  "baseline on admission.  No signs of active bleeding.    Chronic stable diagnoses and other pertinent medical history: Appropriate PTA medications will be resumed.   COPD  CVA 2017: Continue PTA aspirin and statin  Hepatitis C  Vitamin D deficiency  Depression  Anxiety disorder  Osteoarthritis   History of opioid dependence  Tobacco use disorder: As needed nicotine gum/lozenges  GERD  History of type 2 diabetes, last HbA1c of 5.4%, resolved  HLD: Continue PTA statin    Clinically Significant Risk Factors Present on Admission        # Hypokalemia: Lowest K = 2.7 mmol/L in last 2 days, will replace as needed  # Hyponatremia: Lowest Na = 132 mmol/L in last 2 days, will monitor as appropriate  # Hypochloremia: Lowest Cl = 95 mmol/L in last 2 days, will monitor as appropriate  # Hypocalcemia: Lowest Ca = 8.4 mg/dL in last 2 days, will monitor and replace as appropriate   # Hypomagnesemia: Lowest Mg = 1.2 mg/dL in last 2 days, will replace as needed     # Drug Induced Platelet Defect: home medication list includes an antiplatelet medication   # Hypertension: Noted on problem list      # Anemia: based on hgb <11       # Overweight: Estimated body mass index is 25.51 kg/m  as calculated from the following:    Height as of this encounter: 1.702 m (5' 7\").    Weight as of this encounter: 73.9 kg (162 lb 14.4 oz).               PT/OT: ordered  Diet: Combination Diet Regular Diet Adult    DVT Prophylaxis: Pneumatic Compression Devices  Adair Catheter: Not present  Lines: None     Cardiac Monitoring: ACTIVE order. Indication: Chest pain/ ACS rule out (24 hours)  Code Status: Full Code    Medically Ready for Discharge: Anticipated Tomorrow      Marcus Perez MD  Hospitalist Service  [facility]  Securely message with [employer] (more info)  Text page via [employer] Paging/Directory     Data reviewed today: I reviewed all new labs and imaging results over the last 24 hours.    Physical Exam   Temp: 97.4  F " (36.3  C) Temp src: Oral BP: (!) 142/82 Pulse: 74   Resp: 18 SpO2: 98 % O2 Device: None (Room air)    Vitals:    11/22/24 1803   Weight: 73.9 kg (162 lb 14.4 oz)     Vital Signs with Ranges  Temp:  [97.4  F (36.3  C)-98.7  F (37.1  C)] 97.4  F (36.3  C)  Pulse:  [74-88] 74  Resp:  [16-20] 18  BP: (124-149)/(82-94) 142/82  SpO2:  [98 %-100 %] 98 %  I/O last 3 completed shifts:  In: 1510 [P.O.:556; I.V.:954]  Out: -   O2 requirements: none    Constitutional: Female in NAD  HEENT: Eyes nonicteric, oral mucosa moist  Cardiovascular: RRR, normal S1/2, no m/r/g  Respiratory: CTAB, no wheezing or crackles  Vascular: Trace LE pitting edema  GI: Normoactive bowel sounds, nontender  Skin/Integumen: No rashes  Neuro/Psych: Appropriate affect and mood. A&Ox3, moves all extremities    Medications   Current Facility-Administered Medications   Medication Dose Route Frequency Provider Last Rate Last Admin    lactated ringers infusion   Intravenous Continuous Stephanie Shea PA-C 100 mL/hr at 11/22/24 2059 New Bag at 11/22/24 2059     Current Facility-Administered Medications   Medication Dose Route Frequency Provider Last Rate Last Admin    amLODIPine (NORVASC) tablet 10 mg  10 mg Oral Daily Stephanie Shea PA-C   10 mg at 11/23/24 0812    aspirin (ASA) chewable tablet 81 mg  81 mg Oral Daily Stephanie Shea PA-C   81 mg at 11/23/24 0812    atorvastatin (LIPITOR) tablet 20 mg  20 mg Oral Daily Stephanie Shea PA-C   20 mg at 11/23/24 0812    cyanocobalamin (VITAMIN B-12) tablet 1,000 mcg  1,000 mcg Oral Daily Stephanie Shea PA-C   1,000 mcg at 11/23/24 0812    folic acid (FOLVITE) tablet 1 mg  1 mg Oral Daily Stephanie Shea PA-C   1 mg at 11/23/24 0812    heparin ANTICOAGULANT injection 5,000 Units  5,000 Units Subcutaneous Q8H Stephanie Shea PA-C   5,000 Units at 11/23/24 0334    [Held by provider] losartan (COZAAR) tablet 100 mg  100 mg Oral Daily Shabbir  Stephanie Bee PA-C        meloxicam (MOBIC) tablet 7.5 mg  7.5 mg Oral Daily Stephanie Shea PA-C   7.5 mg at 11/23/24 0817    multivitamin w/minerals (THERA-VIT-M) tablet 1 tablet  1 tablet Oral Daily Stephanie Shea PA-C   1 tablet at 11/23/24 0812    senna-docusate (SENOKOT-S/PERICOLACE) 8.6-50 MG per tablet 1 tablet  1 tablet Oral BID Stephanie Shea PA-C        Or    senna-docusate (SENOKOT-S/PERICOLACE) 8.6-50 MG per tablet 2 tablet  2 tablet Oral BID Stephanie Shea PA-C   2 tablet at 11/22/24 2000    sodium chloride (PF) 0.9% PF flush 3 mL  3 mL Intracatheter Q8H Stephanie Shea PA-C   3 mL at 11/22/24 1814    thiamine (B-1) tablet 100 mg  100 mg Oral Daily Stephanie Shea PA-C   100 mg at 11/23/24 0812    Vitamin D3 (CHOLECALCIFEROL) tablet 25 mcg  25 mcg Oral Daily Stephanie Shea PA-C   25 mcg at 11/23/24 0812       Data   Recent Labs   Lab 11/23/24  0107 11/22/24  1835 11/22/24  1123   WBC  --   --  9.7   HGB  --   --  10.5*   MCV  --   --  84   PLT  --   --  123*   NA  --  134* 132*   POTASSIUM 3.2* 2.7* 3.2*   CHLORIDE  --  98 95*   CO2  --  22 24   BUN  --  23.4* 21.5   CR  --  1.68* 1.79*   ANIONGAP  --  14 13   ANIYA  --  8.4* 8.8   GLC  --  108* 120*   ALBUMIN  --   --  3.7   PROTTOTAL  --   --  6.8   BILITOTAL  --   --  0.5   ALKPHOS  --   --  91   ALT  --   --  49   AST  --   --  132*       Imaging:   Recent Results (from the past 24 hours)   XR Chest 2 Views    Narrative    XR CHEST 2 VIEWS   11/22/2024 11:33 AM     HISTORY: Cough.    COMPARISON: None.      Impression    IMPRESSION: No acute cardiopulmonary disease. Left chest pacer.    UNA STERN MD         SYSTEM ID:  [id number]

## 2024-11-23 NOTE — PLAN OF CARE
Goal Outcome Evaluation:    Summary: ETOH, hypokalemia, increased weakness    DATE & TIME: 11/22/24 4689-1113    Cognitive Concerns/ Orientation: A&O x4   BEHAVIOR & AGGRESSION TOOL COLOR: Green  CIWA SCORE: 0   ABNL VS/O2: VSS on RA, except elevated BP  MOBILITY: SBA d/t increased weakness  PAIN MANAGMENT: C/O generalized pain, managed with PRN Oxy x1  DIET: Regular  BOWEL/BLADDER: Cont. B&B.  ABNL LAB/BG: ; K 3.2 (replacement ordered); Phos 2.2 (replacement ordered); Mg 1.2 (replacement ordered); Cr 1.79; ;   DRAIN/DEVICES: R PIV infusing 1L Bolus (LR @ 100 mL/hr to be initiated after)  TELEMETRY RHYTHM: 100% paced  SKIN: scattered scabs and bruises, reddened breast folds  TESTS/PROCEDURES: Echo ordered  D/C DAY/GOALS/PLACE: pending  OTHER IMPORTANT INFO: PT & chem dep consulted

## 2024-11-24 ENCOUNTER — APPOINTMENT (OUTPATIENT)
Dept: PHYSICAL THERAPY | Facility: CLINIC | Age: 69
DRG: 683 | End: 2024-11-24
Attending: PHYSICIAN ASSISTANT
Payer: COMMERCIAL

## 2024-11-24 ENCOUNTER — APPOINTMENT (OUTPATIENT)
Dept: CARDIOLOGY | Facility: CLINIC | Age: 69
DRG: 683 | End: 2024-11-24
Attending: PHYSICIAN ASSISTANT
Payer: COMMERCIAL

## 2024-11-24 LAB
ANION GAP SERPL CALCULATED.3IONS-SCNC: 9 MMOL/L (ref 7–15)
ATRIAL RATE - MUSE: 69 BPM
BUN SERPL-MCNC: 15.8 MG/DL (ref 8–23)
CALCIUM SERPL-MCNC: 8.9 MG/DL (ref 8.8–10.4)
CHLORIDE SERPL-SCNC: 103 MMOL/L (ref 98–107)
CREAT SERPL-MCNC: 1.07 MG/DL (ref 0.51–0.95)
DIASTOLIC BLOOD PRESSURE - MUSE: NORMAL MMHG
EGFRCR SERPLBLD CKD-EPI 2021: 56 ML/MIN/1.73M2
GLUCOSE SERPL-MCNC: 106 MG/DL (ref 70–99)
HCO3 SERPL-SCNC: 24 MMOL/L (ref 22–29)
INTERPRETATION ECG - MUSE: NORMAL
LVEF ECHO: NORMAL
MAGNESIUM SERPL-MCNC: 1.6 MG/DL (ref 1.7–2.3)
P AXIS - MUSE: 54 DEGREES
PHOSPHATE SERPL-MCNC: 3.4 MG/DL (ref 2.5–4.5)
PLATELET # BLD AUTO: 115 10E3/UL (ref 150–450)
POTASSIUM SERPL-SCNC: 3.8 MMOL/L (ref 3.4–5.3)
PR INTERVAL - MUSE: 174 MS
QRS DURATION - MUSE: 154 MS
QT - MUSE: 456 MS
QTC - MUSE: 488 MS
R AXIS - MUSE: 102 DEGREES
SODIUM SERPL-SCNC: 136 MMOL/L (ref 135–145)
SYSTOLIC BLOOD PRESSURE - MUSE: NORMAL MMHG
T AXIS - MUSE: 63 DEGREES
VENTRICULAR RATE- MUSE: 69 BPM

## 2024-11-24 PROCEDURE — 250N000011 HC RX IP 250 OP 636: Performed by: PHYSICIAN ASSISTANT

## 2024-11-24 PROCEDURE — 120N000001 HC R&B MED SURG/OB

## 2024-11-24 PROCEDURE — 250N000013 HC RX MED GY IP 250 OP 250 PS 637: Performed by: PHYSICIAN ASSISTANT

## 2024-11-24 PROCEDURE — 250N000013 HC RX MED GY IP 250 OP 250 PS 637: Performed by: INTERNAL MEDICINE

## 2024-11-24 PROCEDURE — 83735 ASSAY OF MAGNESIUM: CPT | Performed by: INTERNAL MEDICINE

## 2024-11-24 PROCEDURE — 99239 HOSP IP/OBS DSCHRG MGMT >30: CPT | Performed by: INTERNAL MEDICINE

## 2024-11-24 PROCEDURE — 85049 AUTOMATED PLATELET COUNT: CPT | Performed by: PHYSICIAN ASSISTANT

## 2024-11-24 PROCEDURE — 80048 BASIC METABOLIC PNL TOTAL CA: CPT | Performed by: INTERNAL MEDICINE

## 2024-11-24 PROCEDURE — 93010 ELECTROCARDIOGRAM REPORT: CPT | Performed by: INTERNAL MEDICINE

## 2024-11-24 PROCEDURE — 999N000208 ECHOCARDIOGRAM COMPLETE

## 2024-11-24 PROCEDURE — 93005 ELECTROCARDIOGRAM TRACING: CPT

## 2024-11-24 PROCEDURE — 82435 ASSAY OF BLOOD CHLORIDE: CPT | Performed by: INTERNAL MEDICINE

## 2024-11-24 PROCEDURE — 97116 GAIT TRAINING THERAPY: CPT | Mod: GP

## 2024-11-24 PROCEDURE — 84520 ASSAY OF UREA NITROGEN: CPT | Performed by: INTERNAL MEDICINE

## 2024-11-24 PROCEDURE — 84100 ASSAY OF PHOSPHORUS: CPT | Performed by: INTERNAL MEDICINE

## 2024-11-24 PROCEDURE — 97161 PT EVAL LOW COMPLEX 20 MIN: CPT | Mod: GP

## 2024-11-24 PROCEDURE — C8929 TTE W OR WO FOL WCON,DOPPLER: HCPCS

## 2024-11-24 PROCEDURE — 36415 COLL VENOUS BLD VENIPUNCTURE: CPT | Performed by: INTERNAL MEDICINE

## 2024-11-24 PROCEDURE — 97530 THERAPEUTIC ACTIVITIES: CPT | Mod: GP

## 2024-11-24 PROCEDURE — 93306 TTE W/DOPPLER COMPLETE: CPT | Mod: 26 | Performed by: INTERNAL MEDICINE

## 2024-11-24 PROCEDURE — 99207 PR APP CREDIT; MD BILLING SHARED VISIT: CPT | Performed by: INTERNAL MEDICINE

## 2024-11-24 PROCEDURE — 36415 COLL VENOUS BLD VENIPUNCTURE: CPT | Performed by: PHYSICIAN ASSISTANT

## 2024-11-24 PROCEDURE — 255N000002 HC RX 255 OP 636: Performed by: PHYSICIAN ASSISTANT

## 2024-11-24 RX ADMIN — CYANOCOBALAMIN TAB 1000 MCG 1000 MCG: 1000 TAB at 09:05

## 2024-11-24 RX ADMIN — HEPARIN SODIUM 5000 UNITS: 5000 INJECTION, SOLUTION INTRAVENOUS; SUBCUTANEOUS at 03:58

## 2024-11-24 RX ADMIN — Medication 1 LOZENGE: at 16:26

## 2024-11-24 RX ADMIN — ASPIRIN 81 MG CHEWABLE TABLET 81 MG: 81 TABLET CHEWABLE at 09:06

## 2024-11-24 RX ADMIN — THIAMINE HCL TAB 100 MG 100 MG: 100 TAB at 09:06

## 2024-11-24 RX ADMIN — Medication 1 LOZENGE: at 12:22

## 2024-11-24 RX ADMIN — HEPARIN SODIUM 5000 UNITS: 5000 INJECTION, SOLUTION INTRAVENOUS; SUBCUTANEOUS at 21:16

## 2024-11-24 RX ADMIN — FOLIC ACID 1 MG: 1 TABLET ORAL at 09:05

## 2024-11-24 RX ADMIN — Medication 25 MCG: at 09:06

## 2024-11-24 RX ADMIN — MICONAZOLE NITRATE: 2 POWDER TOPICAL at 21:17

## 2024-11-24 RX ADMIN — ACETAMINOPHEN 650 MG: 325 TABLET, FILM COATED ORAL at 16:26

## 2024-11-24 RX ADMIN — OXYCODONE HYDROCHLORIDE 2.5 MG: 5 TABLET ORAL at 05:21

## 2024-11-24 RX ADMIN — OXYCODONE HYDROCHLORIDE 2.5 MG: 5 TABLET ORAL at 12:22

## 2024-11-24 RX ADMIN — MICONAZOLE NITRATE: 2 POWDER TOPICAL at 09:06

## 2024-11-24 RX ADMIN — LOSARTAN POTASSIUM 100 MG: 100 TABLET, FILM COATED ORAL at 09:09

## 2024-11-24 RX ADMIN — ACETAMINOPHEN 650 MG: 325 TABLET, FILM COATED ORAL at 09:06

## 2024-11-24 RX ADMIN — ATORVASTATIN CALCIUM 20 MG: 20 TABLET, FILM COATED ORAL at 09:06

## 2024-11-24 RX ADMIN — PERFLUTREN 2 ML: 6.52 INJECTION, SUSPENSION INTRAVENOUS at 09:27

## 2024-11-24 RX ADMIN — Medication 1 LOZENGE: at 09:06

## 2024-11-24 RX ADMIN — Medication 1 TABLET: at 09:06

## 2024-11-24 RX ADMIN — AMLODIPINE BESYLATE 10 MG: 10 TABLET ORAL at 09:06

## 2024-11-24 RX ADMIN — OXYCODONE HYDROCHLORIDE 2.5 MG: 5 TABLET ORAL at 21:16

## 2024-11-24 RX ADMIN — HEPARIN SODIUM 5000 UNITS: 5000 INJECTION, SOLUTION INTRAVENOUS; SUBCUTANEOUS at 12:22

## 2024-11-24 RX ADMIN — SENNOSIDES AND DOCUSATE SODIUM 1 TABLET: 50; 8.6 TABLET ORAL at 21:16

## 2024-11-24 ASSESSMENT — ACTIVITIES OF DAILY LIVING (ADL)
ADLS_ACUITY_SCORE: 0
DEPENDENT_IADLS:: CLEANING;COOKING;LAUNDRY;SHOPPING;MEAL PREPARATION;TRANSPORTATION
ADLS_ACUITY_SCORE: 0

## 2024-11-24 NOTE — CONSULTS
Consult Orders   Chemical Dependency IP Consult [[id number]] ordered by Stephanie Shea PA-C at 11/22/24 1903            Care Management Initial Consult     General Information  Assessment completed with: Patient, VM-chart review,    Type of CM/SW Visit: Initial Assessment     Primary Care Provider verified and updated as needed: Yes ([facility])   Readmission within the last 30 days: no previous admission in last 30 days      Reason for Consult: discharge planning  Advance Care Planning: Advance Care Planning Reviewed: no concerns identified           Communication Assessment  Patient's communication style: spoken language (English or Bilingual)    Hearing Difficulty or Deaf: no   Wear Glasses or Blind: yes     Cognitive  Cognitive/Neuro/Behavioral: WDL                       Living Environment:   People in home: alone     Current living Arrangements: apartment      Able to return to prior arrangements: yes        Family/Social Support:  Care provided by: other (see comments)  Provides care for: no one  Marital Status: Single  Support system: Children, Friend          Description of Support System: Supportive, Involved          Current Resources:   Patient receiving home care services: No        Community Resources: PCA (5 hours a day; 11 am-4 pm)  Equipment currently used at home: walker, rolling, cane, straight  Supplies currently used at home:       Employment/Financial:  Employment Status:          Financial Concerns: none (denies)            Does the patient's insurance plan have a 3 day qualifying hospital stay waiver?  No     Lifestyle & Psychosocial Needs:  Social Drivers of Health           Food Insecurity: Low Risk  (11/22/2024)     Food Insecurity      Within the past 12 months, did you worry that your food would run out before you got money to buy more?: No      Within the past 12 months, did the food you bought just not last and you didn t have money to get more?: No   Depression: Not on file    Housing Stability: Low Risk  (11/22/2024)     Housing Stability      Do you have housing? : Yes      Are you worried about losing your housing?: No   Tobacco Use: High Risk (7/8/2024)     Received from [facility]     Patient History      Smoking Tobacco Use: Every Day      Smokeless Tobacco Use: Never      Passive Exposure: Current   Financial Resource Strain: Low Risk  (11/22/2024)     Financial Resource Strain      Within the past 12 months, have you or your family members you live with been unable to get utilities (heat, electricity) when it was really needed?: No   Alcohol Use: Not At Risk (12/17/2018)     Received from [facility]     AUDIT-C      Frequency of Alcohol Consumption: Never      Average Number of Drinks: Not on file      Frequency of Binge Drinking: Not on file   Transportation Needs: Low Risk  (11/22/2024)     Transportation Needs      Within the past 12 months, has lack of transportation kept you from medical appointments, getting your medicines, non-medical meetings or appointments, work, or from getting things that you need?: No   Physical Activity: Not on file   Interpersonal Safety: Low Risk  (11/22/2024)     Interpersonal Safety      Do you feel physically and emotionally safe where you currently live?: Yes      Within the past 12 months, have you been hit, slapped, kicked or otherwise physically hurt by someone?: No      Within the past 12 months, have you been humiliated or emotionally abused in other ways by your partner or ex-partner?: No   Stress: Not on file   Social Connections: Not on file   Health Literacy: Not on file         Functional Status:  Prior to admission patient needed assistance:   Dependent ADLs:: Independent, Bathing (at times the PCA may assist with hygiene)  Dependent IADLs:: Cleaning, Cooking, Laundry, Shopping, Meal Preparation, Transportation        Mental Health Status:           Chemical Dependency Status:                  Values/Beliefs:  Spiritual, Cultural Beliefs, Judaism Practices, Values that affect care: no                Discussed  Partnership in Safe Discharge Planning  document with patient/family: No     Additional Information:  Met patient at bedside; introduced self and explained role in discharge planning.     Patient admitted with URI; she came from [facility] for CD treatment and states she is not going back.  She states she is confident is staying sober; she states she has resources and support from friends and family though she does not have a sponsor.  She declines need for any CD resources at this time.     Patient lives in her apartment on the 4th floor with elevator access; the address was corrected to: [address], [address], [place].  She states she lives alone but has a PCA from 12 noon to 4 pm (not every day apparently) who assists with household chores, laundry, meal preparation and sometimes assists with showers.  She does have a shower chair.  She has a walker and cane but does not typically use them.  She uses her medical insurance for ride benefit.     PT has consulted and notes home vs TCU.  Patient explains that she still feels weak and somewhat dizzy so would like to stay until tomorrow.  She would be alone.  Tomorrow she can have her PCA resume services and her son could pick her up.  She anticipates she will not need a TCU.  Message sent to hospitalist via [employer].  Bedside and charge nurse updated.        Rosie Benitez RN  Inpatient Float Care Coordinator  [facility]  [email or web address]

## 2024-11-24 NOTE — PLAN OF CARE
Goal Outcome Evaluation:    Summary: ETOH, hypokalemia, increased weakness    DATE & TIME: 11/23/24 8469-7252   Cognitive Concerns/ Orientation: A&O x4   BEHAVIOR & AGGRESSION TOOL COLOR: Green  CIWA SCORE: discontinued  ABNL VS/O2: VSS on RA, except elevated BP  MOBILITY: Independent  PAIN MANAGMENT: C/O generalized pain, managed with PRN Oxy x1 & Tylenol x2  DIET: Regular  BOWEL/BLADDER: Cont. B&B. BM x2 this shift  ABNL LAB/BG: Cr 1.23; ; A1C 6.4;   DRAIN/DEVICES: R PIV SL  TELEMETRY RHYTHM: 100% V-paced  SKIN: scattered scabs and bruises, reddened breast folds - Miconazole powder applied  TESTS/PROCEDURES: Echo ordered  D/C DAY/GOALS/PLACE: pending SW/CM consult - unable to return to [facility] d/t acuity  OTHER IMPORTANT INFO: PT and chem dep consulted. Intermittent dry cough with no sputum causing throat pain, PRN Tessalon x2 and PRN Maalox given x1 this shift.

## 2024-11-24 NOTE — PROGRESS NOTES
[facility]    Hospitalist Progress Note    Interval History   Improving. Having some right ear pain and sore throat today. Checked right ear with otoscopy, note significant wax but tympanum appears normal in between wax.  - Patient is stable to discharge. Patient does not feel comfortable going home due to weakness. PT still pending--will see.  to assist with discharge disposition    Assessment & Plan   Summary: Kriss Vergara is a 69 year old female with PMH CVA, COPD, tobacco use disorder, alcoholism currently undergoing treatment, hypertension, hyperlipidemia, CHB status post pacemaker who was admitted on 11/22/2024 with URI and acute kidney injury.    Upper respiratory infection, improving  Presented with 4-5 days sore throat, cough, generalized weakness. Has been at [facility] for EtOH tx. Seen in [facility] ED recently for the same but left before seeing the provider, she negative for COVID at that time.  Denies fever or congestion.  [place] indicated that he was too weak to reside there, she has needed help to go up the stairs and help to get out of a chair due to her generalized weakness.  Therefore they will not take her back.    ED workup noted for mild hyponatremia, hypokalemia, HUBER, hypomagnesemia. No leukocytosis.  Chest x-ray without acute pathology.  Strep negative. Due to concerns of injury, electrolyte abnormalities, generalized weakness inhibiting patient from returning back to provide, and elevated troponin with abnormal EKG the patient was admitted.  - Supportive care  - Antitussives, throat spray    HUBER, resolved  Dehydration with electrolyte disturbances  Cr baseline 0.8 range. 1.79 on admission consistent with acute kidney injury. Dehydrated, not eating much for past few days. Cr improving back to baseline 1.07 on 11/24.  - PTA losartan resumed    Elevated troponin, likely demand ischemia due to hypovolemia  Abnormal EKG, improving  Rule out  ACS  Denies CP, notes some VARELA but not recent. EKG paced, new TWI inferior leads compared to EKG done 11/18 in care everywhere. Repeat EKG in ED remittent T wave inversions. Risk stratification: cardiac history, gender, age, smoking history, hyperlipidemia, diabetes. Echocardiogram 11/24 with EF 55-60%, LV and RV unremarkable, mild 1+ AR. LDL relatively low at 31  - Patient may benefit from outpatient ischemic workup such as stress test.  - PTA amlodipine and losartan resumed  - PRN IV hydralazine for SBP >180  - Continue PTA aspirin 81 mg daily, amlodipine 10 mg daily, atorvastatin 20 mg daily    Prolonged QTc  528 ms on initial EKG with electrolyte disturbances as noted above.  Some improvement on repeat EKG. Improved to 488msec on 11/23, remains slightyl elevated 495 msec on 11/24  - Avoid QT prolonging meds    Alcohol use disorder  From [facility] for treatment, they cannot take her back due to level of assistance she needs, generalized weakness. She notes her last alcoholic drink was 2 days prior.  She denies a history of DTs or alcohol withdrawal.  She reports she drinks 2 half pints of vodka per day regularly.  - Chem Dep if still here on Monday  - Check and replete lytes  - MVI, B1 and B9 supplementation  - Stop CIWA, no withdawal this hospital stay    Generalized weakness  At [facility] this week and is required assistance with getting into the chair and transferring, unable to walk up stairs independently.  They report they are unable to take her back.  Prior to this hospital stay she was living alone and home given her alcoholism she will need to be connected with some sort of alcohol treatment program on discharge.  - PT evaluation  - SW/CC consultations for discharge planning    Benign essential hypertension  Complete heart block status post pacemaker 6/2024 at [facility]  PTA regimen: Amlodipine 10 mg daily, losartan 100 mg daily  - Continue PTA amlodipine and losartan  - PRN IV hydralazine available for  "SBP >180  - Monitor BP trend and need for medication adjustments    Chronic mild normocytic anemia  Baseline appears to be 10-11 range.  At baseline on admission.  No signs of active bleeding.    History of type 2 diabetes  Prediabetes   last HbA1c of 5.4%, A1c is 6.4% this hospital stay    Chronic stable diagnoses and other pertinent medical history: Appropriate PTA medications will be resumed.   COPD  CVA 2017: Continue PTA aspirin and statin  Hepatitis C  Vitamin D deficiency  Depression  Anxiety disorder  Osteoarthritis   History of opioid dependence  Tobacco use disorder: As needed nicotine gum/lozenges  GERD  HLD: Continue PTA statin    Clinically Significant Risk Factors        # Hypokalemia: Lowest K = 2.7 mmol/L in last 2 days, will replace as needed  # Hyponatremia: Lowest Na = 134 mmol/L in last 2 days, will monitor as appropriate   # Hypocalcemia: Lowest Ca = 8.4 mg/dL in last 2 days, will monitor and replace as appropriate   # Hypomagnesemia: Lowest Mg = 1.2 mg/dL in last 2 days, will replace as needed     # Thrombocytopenia: Lowest platelets = 106 in last 2 days, will monitor for bleeding   # Hypertension: Noted on problem list              # Overweight: Estimated body mass index is 25.51 kg/m  as calculated from the following:    Height as of this encounter: 1.702 m (5' 7\").    Weight as of this encounter: 73.9 kg (162 lb 14.4 oz)., PRESENT ON ADMISSION             PT/OT: ordered  Diet: Combination Diet Regular Diet Adult  Snacks/Supplements Adult: Ensure Enlive; Between Meals    DVT Prophylaxis: Pneumatic Compression Devices  [place]air Catheter: Not present  Lines: None     Cardiac Monitoring: None  Code Status: Full Code    Medically Ready for Discharge: Ready Now to TCU vs home per       Marcus Perez MD  Hospitalist Service  [facility]  Securely message with [employer] (more info)  Text page via [employer] Paging/Directory     Data reviewed today: I reviewed all " new labs and imaging results over the last 24 hours.    Physical Exam   Temp: 98.1  F (36.7  C) Temp src: Oral BP: (!) 144/93 Pulse: 85   Resp: 20 SpO2: 99 % O2 Device: None (Room air)    Vitals:    11/22/24 1803   Weight: 73.9 kg (162 lb 14.4 oz)     Vital Signs with Ranges  Temp:  [97.5  F (36.4  C)-98.9  F (37.2  C)] 98.1  F (36.7  C)  Pulse:  [75-85] 85  Resp:  [16-20] 20  BP: (131-151)/(83-94) 144/93  SpO2:  [98 %-100 %] 99 %  I/O last 3 completed shifts:  In: 960 [P.O.:960]  Out: -   O2 requirements: none    Constitutional: Female in NAD  HEENT: Eyes nonicteric, oral mucosa moist  Cardiovascular: RRR, normal S1/2, no m/r/g  Respiratory: CTAB, no wheezing or crackles  Vascular: Trace LE pitting edema  GI: Normoactive bowel sounds, nontender  Skin/Integumen: No rashes  Neuro/Psych: Appropriate affect and mood. A&Ox3, moves all extremities    Medications   Current Facility-Administered Medications   Medication Dose Route Frequency Provider Last Rate Last Admin     Current Facility-Administered Medications   Medication Dose Route Frequency Provider Last Rate Last Admin    amLODIPine (NORVASC) tablet 10 mg  10 mg Oral Daily Stephanie Shea PA-C   10 mg at 11/24/24 0906    aspirin (ASA) chewable tablet 81 mg  81 mg Oral Daily Stephanie Shea PA-C   81 mg at 11/24/24 0906    atorvastatin (LIPITOR) tablet 20 mg  20 mg Oral Daily Stephanie Shea PA-C   20 mg at 11/24/24 0906    cyanocobalamin (VITAMIN B-12) tablet 1,000 mcg  1,000 mcg Oral Daily Stephanie Shea PA-C   1,000 mcg at 11/24/24 0905    folic acid (FOLVITE) tablet 1 mg  1 mg Oral Daily Stephanie Shea PA-C   1 mg at 11/24/24 0905    heparin ANTICOAGULANT injection 5,000 Units  5,000 Units Subcutaneous Q8H Stephanie Shea PA-C   5,000 Units at 11/24/24 0358    losartan (COZAAR) tablet 100 mg  100 mg Oral Daily Marcus Perez MD   100 mg at 11/24/24 0909    meloxicam (MOBIC) tablet 7.5 mg  7.5  mg Oral Daily Stephanie Shea PA-C   7.5 mg at 11/23/24 0817    miconazole (MICATIN) 2 % powder   Topical BID Marcus Perez MD   Given at 11/24/24 0906    multivitamin w/minerals (THERA-VIT-M) tablet 1 tablet  1 tablet Oral Daily Stephanie Shea PA-C   1 tablet at 11/24/24 0906    senna-docusate (SENOKOT-S/PERICOLACE) 8.6-50 MG per tablet 1 tablet  1 tablet Oral BID Stephanie Shea PA-C   1 tablet at 11/23/24 2037    Or    senna-docusate (SENOKOT-S/PERICOLACE) 8.6-50 MG per tablet 2 tablet  2 tablet Oral BID Stephanie Shea PA-C   2 tablet at 11/22/24 2000    sodium chloride (PF) 0.9% PF flush 3 mL  3 mL Intracatheter Q8H Stephanie Shea PA-C   3 mL at 11/24/24 0906    thiamine (B-1) tablet 100 mg  100 mg Oral Daily Stephanie Shea PA-C   100 mg at 11/24/24 0906    Vitamin D3 (CHOLECALCIFEROL) tablet 25 mcg  25 mcg Oral Daily Stephanie Shea PA-C   25 mcg at 11/24/24 0906       Data   Recent Labs   Lab 11/24/24  0637 11/23/24  1212 11/23/24  0107 11/22/24  1835 11/22/24  1123   WBC  --  5.5  --   --  9.7   HGB  --  10.5*  --   --  10.5*   MCV  --  87  --   --  84   PLT  --  106*  --   --  123*    137  --  134* 132*   POTASSIUM 3.8 3.7  3.7 3.2* 2.7* 3.2*   CHLORIDE 103 102  --  98 95*   CO2 24 24  --  22 24   BUN 15.8 18.5  --  23.4* 21.5   CR 1.07* 1.23*  --  1.68* 1.79*   ANIONGAP 9 11  --  14 13   ANIYA 8.9 9.1  --  8.4* 8.8   * 106*  --  108* 120*   ALBUMIN  --  3.5  --   --  3.7   PROTTOTAL  --  6.3*  --   --  6.8   BILITOTAL  --  0.3  --   --  0.5   ALKPHOS  --  98  --   --  91   ALT  --  50  --   --  49   AST  --  116*  --   --  132*       Imaging:   Recent Results (from the past 24 hours)   Echocardiogram Complete   Result Value    LVEF  55-60%    [facility]    [id number]  [id number]  [id number]^ROBERTO^^CARMELO     [facility]  Echocardiography Laboratory  [address] [place] [address]  [id number]     Name: ANA MARIA BERMEO  MRN: [id number]  : 1955  Study Date: 2024 09:07 AM  Age: 69 yrs  Gender: Female  Patient Location: [facility]  Reason For Study: VARELA  Ordering Physician: ABIGAIL MEJIA  Referring Physician: [facility]  Performed By: Fred Bajwa RDCS     BSA: 1.8 m2  Height: 67 in  Weight: 162 lb  HR: 76  BP: 151/93 mmHg  ______________________________________________________________________________  Procedure  Complete Portable Echo Adult. Definity (NDC #41170-385) given intravenously.  ______________________________________________________________________________  Interpretation Summary     The visual ejection fraction is 55-60%.  The left ventricle is normal in structure, function and size.  Septal motion is consistent with conduction abnormality.  The right ventricle is normal in structure, function and size.  There is mild (1+) aortic regurgitation.  There is no pericardial effusion.  ______________________________________________________________________________  Left Ventricle  The left ventricle is normal in structure, function and size. There is normal  left ventricular wall thickness. The visual ejection fraction is 55-60%.  Septal motion is consistent with conduction abnormality.     Right Ventricle  The right ventricle is normal in structure, function and size.     Atria  Normal left atrial size.     Mitral Valve  There is no mitral regurgitation noted.     Tricuspid Valve  There is trace tricuspid regurgitation.     Aortic Valve  There is mild (1+) aortic regurgitation.     Pulmonic Valve  The pulmonic valve is not well visualized.     Vessels  Normal size aorta. Normal size ascending aorta.     Pericardium  There is no pericardial effusion.     ______________________________________________________________________________  MMode/2D Measurements & Calculations  IVSd: 1.3 cm  LVIDd: 4.6 cm  LVIDs: 3.2 cm  LVPWd: 1.0 cm  FS: 30.7 %  LV mass(C)d:  197.5 grams  LV mass(C)dI: 106.8 grams/m2     Ao root diam: 3.3 cm  LA dimension: 3.4 cm  asc Aorta Diam: 3.6 cm  LA/Ao: 1.0  Ao root diam index Ht(cm/m): 1.9  Ao root diam index BSA (cm/m2): 1.8  Asc Ao diam index BSA (cm/m2): 2.0  Asc Ao diam index Ht(cm/m): 2.1  LA Volume (BP): 62.3 ml  LA Volume Index (BP): 33.7 ml/m2  RV Base: 3.1 cm     RWT: 0.45  TAPSE: 2.2 cm     Doppler Measurements & Calculations  MV E max marco a: 64.3 cm/sec  MV A max marco a: 112.3 cm/sec  MV E/A: 0.57  MV dec slope: 285.2 cm/sec2  MV dec time: 0.23 sec  AI P1/2t: 471.9 msec  PA acc time: 0.12 sec  TR max marco a: 218.0 cm/sec  TR max P.0 mmHg  E/E' av.3  Lateral E/e': 13.1  Medial E/e': 13.4  RV S Marco A: 10.8 cm/sec     ______________________________________________________________________________  Report approved by: SOMMER Millard 2024 10:18 AM

## 2024-11-24 NOTE — PROGRESS NOTES
Summary: ETOH, hypokalemia, increased weakness    Orientation: A/O x4, Oxy given for pain    Vitals/Tele: VSS on RA, Tele: PAC    IV Access/drains: R PIV SL     Diet: Regular    Mobility: Ind     GI/: Continent of B/B    Wound/Skin: Scattered bruising     Consults: SW/CM    Discharge Plan: TBD      See Flow sheets for assessment

## 2024-11-24 NOTE — PLAN OF CARE
Goal Outcome Evaluation:      Plan of Care Reviewed With: patient          Outcome Evaluation: Discharge to home with ongoing PCA services vs TCU

## 2024-11-24 NOTE — DISCHARGE SUMMARY
[facility]    Hospitalist Discharge Summary       Date of Admission:  11/22/2024  Date of Discharge:  11/24/2024  Discharging Provider: Marcus Perez MD      Discharge Diagnoses   HUBER, prerenal, resolved  Upper respiratory infection  Elevated troponin, likely demand ischemia from hypovolemia  Prolonged QTc  Alcohol use disorder  Generalized weakness    Follow-ups Needed After Discharge   Follow-up Appointments       Follow-up and recommended labs and tests       Follow up with primary care provider, [facility], within 7 days for hospital follow- up.  No follow up labs or test are needed.  - Follow up with a Cardiologist in 2-4 weeks for evaluation of heart disease              Hospital Course   Kriss Vergara is a 69 year old female with PMH CVA, COPD, tobacco use disorder, alcoholism currently undergoing treatment, hypertension, hyperlipidemia, CHB status post pacemaker who was admitted on 11/22/2024 with URI and acute kidney injury.  Presented with 4-5 days sore throat, cough, generalized weakness. Has been at [facility] for alcohol treatment. Seen in [facility] ED recently for the same but left before seeing the provider, she was negative for COVID at that time.  Denies fever or congestion.  [place] indicated that he was too weak to reside there, she has needed help to go up the stairs and help to get out of a chair due to her generalized weakness.  Therefore they will not take her back.    ED workup noted for mild hyponatremia, hypokalemia, HUBER, hypomagnesemia. No leukocytosis.  Chest x-ray without acute pathology.  Strep negative. Due to concerns of injury, electrolyte abnormalities, generalized weakness inhibiting patient from returning back to provide, and elevated troponin with abnormal EKG the patient was admitted. Patient improved with supportive care. Her Antihypertensives were restarted once her HUBER resolved. She is stable to discharge. Seen by  PT who recommended back home with 5hrs of PCA services (pre-existing) or to TCU.    Elevated troponin, likely demand ischemia due to hypovolemia  Abnormal EKG, improving  Rule out ACS  Denies CP, notes some VARELA but not recent. EKG paced, new TWI inferior leads compared to EKG done 11/18 in care everywhere. Repeat EKG in ED remittent T wave inversions. Risk stratification: cardiac history, gender, age, smoking history, hyperlipidemia, diabetes. Echocardiogram 11/24 with EF 55-60%, LV and RV unremarkable, mild 1+ AR. LDL relatively low at 31  - Patient may benefit from outpatient ischemic workup such as stress test, follow up with Cardiology  - PTA amlodipine and losartan resumed  - Continue PTA aspirin 81 mg daily, amlodipine 10 mg daily, atorvastatin 20 mg daily    Prolonged QTc  528 ms on initial EKG with electrolyte disturbances as noted above.  Some improvement on repeat EKG. Improved to 488msec on 11/23, remains slightyl elevated 495 msec on 11/24  - Avoid QT prolonging meds  - Recheck EKG as outpatient    Alcohol use disorder  From [place] for treatment, they cannot take her back due to level of assistance she needs, generalized weakness. She notes her last alcoholic drink was 2 days prior.  She denies a history of DTs or alcohol withdrawal.  She reports she drinks 2 half pints of vodka per day regularly. No alcohol withdrawal noted here. Patient states she continues to stay sober following discharge.  - Recommended outpatient treatment    Benign essential hypertension  Complete heart block status post pacemaker 6/2024 at [facility]  - Continue PTA amlodipine and losartan    Chronic mild normocytic anemia  Baseline appears to be 10-11 range.  At baseline on admission.  No signs of active bleeding.    History of type 2 diabetes  Prediabetes  Last HbA1c of 5.4%, A1c is 6.4% this hospital stay  - Outpatient follow up    Clinically Significant Risk Factors        # Hypokalemia: Lowest K = 2.7 mmol/L in last 2  "days, will replace as needed  # Hyponatremia: Lowest Na = 134 mmol/L in last 2 days, will monitor as appropriate   # Hypocalcemia: Lowest Ca = 8.4 mg/dL in last 2 days, will monitor and replace as appropriate   # Hypomagnesemia: Lowest Mg = 1.2 mg/dL in last 2 days, will replace as needed     # Thrombocytopenia: Lowest platelets = 106 in last 2 days, will monitor for bleeding   # Hypertension: Noted on problem list            # Overweight: Estimated body mass index is 25.51 kg/m  as calculated from the following:    Height as of this encounter: 1.702 m (5' 7\").    Weight as of this encounter: 73.9 kg (162 lb 14.4 oz)., PRESENT ON ADMISSION              Consultations This Hospital Stay   PHYSICAL THERAPY ADULT IP CONSULT  CARE MANAGEMENT / SOCIAL WORK IP CONSULT  CHEMICAL DEPENDENCY IP CONSULT    Code Status   Full Code    Time Spent on this Encounter   I, Marcus Perez, personally saw the patient today and spent approximately 40 minutes discharging this patient.       Mracus Perez MD  [facility]  ______________________________________________________________________    Physical Exam   Vital Signs: Temp: 98.1  F (36.7  C) Temp src: Oral BP: (!) 144/93 Pulse: 85   Resp: 20 SpO2: 99 % O2 Device: None (Room air)    Weight: 162 lbs 14.4 oz    Constitutional: Female in NAD  HEENT: Eyes nonicteric, oral mucosa moist  Cardiovascular: RRR, normal S1/2, no m/r/g  Respiratory: CTAB, no wheezing or crackles  Vascular: Trace LE pitting edema  GI: Normoactive bowel sounds, nontender  Skin/Integumen: No rashes  Neuro/Psych: Appropriate affect and mood. A&Ox3, moves all extremities       Primary Care Physician   [facility]    Discharge Disposition   Discharged to home  Condition at discharge: Stable    Significant Results and Procedures   Most Recent 3 CBC's:  Recent Labs   Lab Test 11/23/24  1212 11/22/24  1123   WBC 5.5 9.7   HGB 10.5* 10.5*   MCV 87 84   * 123* "     Most Recent 3 BMP's:  Recent Labs   Lab Test 24  0637 24  1212 24  0107 24  1835    137  --  134*   POTASSIUM 3.8 3.7  3.7 3.2* 2.7*   CHLORIDE 103 102  --  98   CO2 24   --     BUN 15.8 18.5  --  23.4*   CR 1.07* 1.23*  --  1.68*   ANIONGAP 9 11  --  14   ANIYA 8.9 9.1  --  8.4*   * 106*  --  108*     Most Recent 2 LFT's:  Recent Labs   Lab Test 24  1212 24  1123   * 132*   ALT 50 49   ALKPHOS 98 91   BILITOTAL 0.3 0.5     Most Recent 3 INR's:No lab results found.  Most Recent 3 Troponin's:No lab results found.  Most Recent 3 BNP's:No lab results found.  Most Recent D-dimer:No lab results found.  Most Recent Cholesterol Panel:  Recent Labs   Lab Test 24  1212   CHOL 133   LDL 31   HDL 89   TRIG 65       Results for orders placed or performed during the hospital encounter of 24   XR Chest 2 Views    Narrative    XR CHEST 2 VIEWS   2024 11:33 AM     HISTORY: Cough.    COMPARISON: None.      Impression    IMPRESSION: No acute cardiopulmonary disease. Left chest pacer.    UNA STERN MD         SYSTEM ID:  [id number]   Echocardiogram Complete     Value    LVEF  55-60%    Narrative    [id number]  [id number]  [id number]  [id number]^ROBERTO^ABIGAIL^CARMELO     [facility]  Echocardiography Laboratory  [address] [place] [address]     Name: ANA MARIA BERMEO  MRN: [id number]  : 1955  Study Date: 2024 09:07 AM  Age: 69 yrs  Gender: Female  Patient Location: [facility]  Reason For Study: VARELA  Ordering Physician: ABIGAIL MEJIA  Referring Physician: [facility]  Performed By: Fred Bajwa RDCS     BSA: 1.8 m2  Height: 67 in  Weight: 162 lb  HR: 76  BP: 151/93 mmHg  ______________________________________________________________________________  Procedure  Complete Portable Echo Adult. Definity (NDC #93600-462) given  intravenously.  ______________________________________________________________________________  Interpretation Summary     The visual ejection fraction is 55-60%.  The left ventricle is normal in structure, function and size.  Septal motion is consistent with conduction abnormality.  The right ventricle is normal in structure, function and size.  There is mild (1+) aortic regurgitation.  There is no pericardial effusion.  ______________________________________________________________________________  Left Ventricle  The left ventricle is normal in structure, function and size. There is normal  left ventricular wall thickness. The visual ejection fraction is 55-60%.  Septal motion is consistent with conduction abnormality.     Right Ventricle  The right ventricle is normal in structure, function and size.     Atria  Normal left atrial size.     Mitral Valve  There is no mitral regurgitation noted.     Tricuspid Valve  There is trace tricuspid regurgitation.     Aortic Valve  There is mild (1+) aortic regurgitation.     Pulmonic Valve  The pulmonic valve is not well visualized.     Vessels  Normal size aorta. Normal size ascending aorta.     Pericardium  There is no pericardial effusion.     ______________________________________________________________________________  MMode/2D Measurements & Calculations  IVSd: 1.3 cm  LVIDd: 4.6 cm  LVIDs: 3.2 cm  LVPWd: 1.0 cm  FS: 30.7 %  LV mass(C)d: 197.5 grams  LV mass(C)dI: 106.8 grams/m2     Ao root diam: 3.3 cm  LA dimension: 3.4 cm  asc Aorta Diam: 3.6 cm  LA/Ao: 1.0  Ao root diam index Ht(cm/m): 1.9  Ao root diam index BSA (cm/m2): 1.8  Asc Ao diam index BSA (cm/m2): 2.0  Asc Ao diam index Ht(cm/m): 2.1  LA Volume (BP): 62.3 ml  LA Volume Index (BP): 33.7 ml/m2  RV Base: 3.1 cm     RWT: 0.45  TAPSE: 2.2 cm     Doppler Measurements & Calculations  MV E max apurav: 64.3 cm/sec  MV A max apurva: 112.3 cm/sec  MV E/A: 0.57  MV dec slope: 285.2 cm/sec2  MV dec time: 0.23 sec  AI  P1/2t: 471.9 msec  PA acc time: 0.12 sec  TR max marco a: 218.0 cm/sec  TR max P.0 mmHg  E/E' av.3  Lateral E/e': 13.1  Medial E/e': 13.4  RV S Marco A: 10.8 cm/sec     ______________________________________________________________________________  Report approved by: SOMMER Millard 2024 10:18 AM             Discharge Orders      Physical Therapy  Referral      Reason for your hospital stay    You were hospitalized for significant dehydration in the setting of an upper respiratory infection.     Follow-up and recommended labs and tests     Follow up with primary care provider, [facility], within 7 days for hospital follow- up.  No follow up labs or test are needed.  - Follow up with a Cardiologist in 2-4 weeks for evaluation of heart disease     Activity    Your activity upon discharge: activity as tolerated     Diet    Follow this diet upon discharge:   Regular Diet Adult     Discharge Medications   Current Discharge Medication List        CONTINUE these medications which have NOT CHANGED    Details   acetaminophen (TYLENOL) 325 MG tablet Take 325-650 mg by mouth every 4 hours as needed for mild pain.      albuterol (PROAIR HFA/PROVENTIL HFA/VENTOLIN HFA) 108 (90 Base) MCG/ACT inhaler Inhale 2 puffs into the lungs every 4 hours as needed for shortness of breath or wheezing.      amLODIPine (NORVASC) 10 MG tablet Take 10 mg by mouth daily.      aspirin (ASA) 81 MG chewable tablet Take 81 mg by mouth daily.      atorvastatin (LIPITOR) 20 MG tablet Take 20 mg by mouth daily.      benzonatate (TESSALON) 100 MG capsule Take 100-200 mg by mouth 3 times daily as needed for cough.      cyanocobalamin (VITAMIN B-12) 1000 MCG tablet Take 1,000 mcg by mouth daily.      folic acid (FOLVITE) 1 MG tablet Take 1 mg by mouth daily.      LORazepam (ATIVAN) 0.5 MG tablet Take 0.5-2 mg by mouth every 6 hours as needed for withdrawal.      losartan (COZAAR) 100 MG tablet Take 100 mg by  mouth daily.      meloxicam (MOBIC) 7.5 MG tablet Take 7.5 mg by mouth daily.      nicotine (NICORETTE) 2 MG gum Place 2 mg inside cheek every hour as needed for nicotine withdrawal symptoms.      Vitamin D3 (CHOLECALCIFEROL) 25 mcg (1000 units) tablet Take 1 tablet by mouth daily.           Allergies   Allergies   Allergen Reactions    Codeine Unknown
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
## 2024-11-24 NOTE — CONSULTS
Care Management Initial Consult    General Information  Assessment completed with: Patient, VM-chart review,    Type of CM/SW Visit: Initial Assessment    Primary Care Provider verified and updated as needed: Yes ([facility])   Readmission within the last 30 days: no previous admission in last 30 days      Reason for Consult: discharge planning  Advance Care Planning: Advance Care Planning Reviewed: no concerns identified          Communication Assessment  Patient's communication style: spoken language (English or Bilingual)    Hearing Difficulty or Deaf: no   Wear Glasses or Blind: yes    Cognitive  Cognitive/Neuro/Behavioral: WDL                      Living Environment:   People in home: alone     Current living Arrangements: apartment      Able to return to prior arrangements: yes       Family/Social Support:  Care provided by: other (see comments)  Provides care for: no one  Marital Status: Single  Support system: Children, Friend          Description of Support System: Supportive, Involved         Current Resources:   Patient receiving home care services: No        Community Resources: PCA (5 hours a day; 11 am-4 pm)  Equipment currently used at home: walker, rolling, cane, straight  Supplies currently used at home:      Employment/Financial:  Employment Status:          Financial Concerns: none (denies)           Does the patient's insurance plan have a 3 day qualifying hospital stay waiver?  No    Lifestyle & Psychosocial Needs:  Social Drivers of Health     Food Insecurity: Low Risk  (11/22/2024)    Food Insecurity     Within the past 12 months, did you worry that your food would run out before you got money to buy more?: No     Within the past 12 months, did the food you bought just not last and you didn t have money to get more?: No   Depression: Not on file   Housing Stability: Low Risk  (11/22/2024)    Housing Stability     Do you have housing? : Yes     Are you worried about losing your housing?: No   Tobacco  Use: High Risk (7/8/2024)    Received from [facility]    Patient History     Smoking Tobacco Use: Every Day     Smokeless Tobacco Use: Never     Passive Exposure: Current   Financial Resource Strain: Low Risk  (11/22/2024)    Financial Resource Strain     Within the past 12 months, have you or your family members you live with been unable to get utilities (heat, electricity) when it was really needed?: No   Alcohol Use: Not At Risk (12/17/2018)    Received from [facility]    AUDIT-C     Frequency of Alcohol Consumption: Never     Average Number of Drinks: Not on file     Frequency of Binge Drinking: Not on file   Transportation Needs: Low Risk  (11/22/2024)    Transportation Needs     Within the past 12 months, has lack of transportation kept you from medical appointments, getting your medicines, non-medical meetings or appointments, work, or from getting things that you need?: No   Physical Activity: Not on file   Interpersonal Safety: Low Risk  (11/22/2024)    Interpersonal Safety     Do you feel physically and emotionally safe where you currently live?: Yes     Within the past 12 months, have you been hit, slapped, kicked or otherwise physically hurt by someone?: No     Within the past 12 months, have you been humiliated or emotionally abused in other ways by your partner or ex-partner?: No   Stress: Not on file   Social Connections: Not on file   Health Literacy: Not on file       Functional Status:  Prior to admission patient needed assistance:   Dependent ADLs:: Independent, Bathing (at times the PCA may assist with hygiene)  Dependent IADLs:: Cleaning, Cooking, Laundry, Shopping, Meal Preparation, Transportation       Mental Health Status:          Chemical Dependency Status:                Values/Beliefs:  Spiritual, Cultural Beliefs, Faith Practices, Values that affect care: no               Discussed  Partnership in Safe Discharge Planning  document with patient/family:  No    Additional Information:  Met patient at bedside; introduced self and explained role in discharge planning.    Patient admitted with URI; she came from [facility] for CD treatment and states she is not going back.  She states she is confident is staying sober; she states she has resources and support from friends and family though she does not have a sponsor.  She declines need for any CD resources at this time.    Patient lives in her apartment on the 4th floor with elevator access; the address was corrected to: [address], [place].  She states she lives alone but has a PCA from 12 noon to 4 pm (not every day apparently) who assists with household chores, laundry, meal preparation and sometimes assists with showers.  She does have a shower chair.  She has a walker and cane but does not typically use them.  She uses her medical insurance for ride benefit.    PT has consulted and notes home vs TCU.  Patient explains that she still feels weak and somewhat dizzy so would like to stay until tomorrow.  She would be alone.  Tomorrow she can have her PCA resume services and her son could pick her up.  She anticipates she will not need a TCU.  Message sent to hospitalist via [email or web address].  Bedside and charge nurse updated.      Rosie Benitez RN  Inpatient Float Care Coordinator  [facility]  [email or web address]

## 2024-11-25 VITALS
RESPIRATION RATE: 18 BRPM | OXYGEN SATURATION: 99 % | HEART RATE: 88 BPM | HEIGHT: 67 IN | SYSTOLIC BLOOD PRESSURE: 160 MMHG | TEMPERATURE: 99 F | BODY MASS INDEX: 25.57 KG/M2 | DIASTOLIC BLOOD PRESSURE: 96 MMHG | WEIGHT: 162.9 LBS

## 2024-11-25 LAB
ATRIAL RATE - MUSE: 79 BPM
ATRIAL RATE - MUSE: 82 BPM
DIASTOLIC BLOOD PRESSURE - MUSE: NORMAL MMHG
DIASTOLIC BLOOD PRESSURE - MUSE: NORMAL MMHG
INTERPRETATION ECG - MUSE: NORMAL
INTERPRETATION ECG - MUSE: NORMAL
MAGNESIUM SERPL-MCNC: 1.6 MG/DL (ref 1.7–2.3)
P AXIS - MUSE: 45 DEGREES
P AXIS - MUSE: 54 DEGREES
PHOSPHATE SERPL-MCNC: 4.5 MG/DL (ref 2.5–4.5)
POTASSIUM SERPL-SCNC: 4.2 MMOL/L (ref 3.4–5.3)
PR INTERVAL - MUSE: 170 MS
PR INTERVAL - MUSE: 174 MS
QRS DURATION - MUSE: 142 MS
QRS DURATION - MUSE: 144 MS
QT - MUSE: 404 MS
QT - MUSE: 424 MS
QTC - MUSE: 463 MS
QTC - MUSE: 495 MS
R AXIS - MUSE: 108 DEGREES
R AXIS - MUSE: 114 DEGREES
SYSTOLIC BLOOD PRESSURE - MUSE: NORMAL MMHG
SYSTOLIC BLOOD PRESSURE - MUSE: NORMAL MMHG
T AXIS - MUSE: 64 DEGREES
T AXIS - MUSE: 71 DEGREES
VENTRICULAR RATE- MUSE: 79 BPM
VENTRICULAR RATE- MUSE: 82 BPM

## 2024-11-25 PROCEDURE — 93005 ELECTROCARDIOGRAM TRACING: CPT

## 2024-11-25 PROCEDURE — 250N000011 HC RX IP 250 OP 636: Performed by: PHYSICIAN ASSISTANT

## 2024-11-25 PROCEDURE — 84132 ASSAY OF SERUM POTASSIUM: CPT | Performed by: INTERNAL MEDICINE

## 2024-11-25 PROCEDURE — 250N000013 HC RX MED GY IP 250 OP 250 PS 637: Performed by: PHYSICIAN ASSISTANT

## 2024-11-25 PROCEDURE — 250N000013 HC RX MED GY IP 250 OP 250 PS 637: Performed by: INTERNAL MEDICINE

## 2024-11-25 PROCEDURE — 93010 ELECTROCARDIOGRAM REPORT: CPT | Performed by: INTERNAL MEDICINE

## 2024-11-25 PROCEDURE — 83735 ASSAY OF MAGNESIUM: CPT | Performed by: INTERNAL MEDICINE

## 2024-11-25 PROCEDURE — 84100 ASSAY OF PHOSPHORUS: CPT | Performed by: INTERNAL MEDICINE

## 2024-11-25 PROCEDURE — 99238 HOSP IP/OBS DSCHRG MGMT 30/<: CPT | Performed by: INTERNAL MEDICINE

## 2024-11-25 PROCEDURE — 36415 COLL VENOUS BLD VENIPUNCTURE: CPT | Performed by: INTERNAL MEDICINE

## 2024-11-25 RX ADMIN — HEPARIN SODIUM 5000 UNITS: 5000 INJECTION, SOLUTION INTRAVENOUS; SUBCUTANEOUS at 02:00

## 2024-11-25 RX ADMIN — Medication 1 TABLET: at 09:47

## 2024-11-25 RX ADMIN — Medication 1 LOZENGE: at 12:51

## 2024-11-25 RX ADMIN — Medication 1 LOZENGE: at 10:00

## 2024-11-25 RX ADMIN — FOLIC ACID 1 MG: 1 TABLET ORAL at 09:47

## 2024-11-25 RX ADMIN — LOSARTAN POTASSIUM 100 MG: 100 TABLET, FILM COATED ORAL at 09:47

## 2024-11-25 RX ADMIN — SENNOSIDES AND DOCUSATE SODIUM 1 TABLET: 50; 8.6 TABLET ORAL at 09:48

## 2024-11-25 RX ADMIN — Medication 25 MCG: at 09:47

## 2024-11-25 RX ADMIN — HEPARIN SODIUM 5000 UNITS: 5000 INJECTION, SOLUTION INTRAVENOUS; SUBCUTANEOUS at 12:48

## 2024-11-25 RX ADMIN — Medication 1 LOZENGE: at 01:49

## 2024-11-25 RX ADMIN — ACETAMINOPHEN 650 MG: 325 TABLET, FILM COATED ORAL at 01:49

## 2024-11-25 RX ADMIN — MICONAZOLE NITRATE: 2 POWDER TOPICAL at 09:49

## 2024-11-25 RX ADMIN — ATORVASTATIN CALCIUM 20 MG: 20 TABLET, FILM COATED ORAL at 09:47

## 2024-11-25 RX ADMIN — MELOXICAM 7.5 MG: 7.5 TABLET ORAL at 09:48

## 2024-11-25 RX ADMIN — AMLODIPINE BESYLATE 10 MG: 10 TABLET ORAL at 09:46

## 2024-11-25 RX ADMIN — ASPIRIN 81 MG CHEWABLE TABLET 81 MG: 81 TABLET CHEWABLE at 09:46

## 2024-11-25 RX ADMIN — CYANOCOBALAMIN TAB 1000 MCG 1000 MCG: 1000 TAB at 09:47

## 2024-11-25 RX ADMIN — THIAMINE HCL TAB 100 MG 100 MG: 100 TAB at 09:47

## 2024-11-25 ASSESSMENT — ACTIVITIES OF DAILY LIVING (ADL)
ADLS_ACUITY_SCORE: 0
ADLS_ACUITY_SCORE: 39
ADLS_ACUITY_SCORE: 0
ADLS_ACUITY_SCORE: 39
ADLS_ACUITY_SCORE: 0

## 2024-11-25 NOTE — CONSULTS
Met with pt for CD Consult and introduced self and role in pt's care.  Inquired about pt's interest in DC Assessment for referrals to [facility] or any additional community resources.  Pt confirms she was sent to the hospital from [facility] and that her belongings and meds are still there, pt reports she has already contacted [facility] about this and is waiting for them to call her back.  Pt reports she intends to return home at discharge and declined to complete DC Assessment for referrals to [facility] or any additional CD resource needs at this time.    Relayed to pt that if they change their mind while admitted and would like to complete a DC Assessment for referrals to treatment or need any additional CD Resources they may alert unit staff who will assist in having CD Consult re-ordered.  If pt has active insurance they may also schedule an assessment on an outpatient basis by calling [facility] at [phone].    FILEMON Varela, [facility]  Substance Use Disorder Evaluation Counselor  Email: [email or web address]

## 2024-11-25 NOTE — PROGRESS NOTES
Discharge    Patient discharged to home with PCA help  via wheelchair with son  Care plan note: See POC    Listed belongings gathered and given to patient (including from security/pharmacy). Yes  Care Plan and Patient education resolved: Yes  Prescriptions if needed, hard copies sent with patient  Yes  Medication Bin checked and emptied on discharge Yes  SW/care coordinator/charge RN aware of discharge: Yes

## 2024-11-25 NOTE — PROGRESS NOTES
Care Management Discharge Note    Discharge Date: 11/25/2024       Discharge Disposition:  Home    Discharge Services:  none    Discharge DME:      Discharge Transportation: agency    Private pay costs discussed: Not applicable    Does the patient's insurance plan have a 3 day qualifying hospital stay waiver?  No    PAS Confirmation Code:    Patient/family educated on Medicare website which has current facility and service quality ratings:      Education Provided on the Discharge Plan:    Persons Notified of Discharge Plans: patient, bedside nurse  Patient/Family in Agreement with the Plan: yes    Handoff Referral Completed: No, handoff not indicated or clinically appropriate    Additional Information:  Patient discharging home today.    Patient needs a follow up appointment with her PCP at [facility].  This appointment was scheduled for the earliest available on Monday December 9 at 12:40 pm with provider Robert Melissa at the [facility] at [address] [place] [address].    Patient was admitted from [facility] and has her belongings and medications there.  She called them and will retrieve her items.    Her son will provide transportation for her.      Rosie Benitez RN  Inpatient Float Care Coordinator  [facility]  [email or web address]         Detail Level: Zone Plan: betamethasone, augmented 0.05 % topical cream BID for two weeks then one week monthly there after prn

## 2024-11-25 NOTE — PROGRESS NOTES
[facility]    Hospitalist Progress Note       Date of Admission:  11/22/2024  Discharging Provider: Marcus Perez MD      Discharge Diagnoses   HUBER, prerenal, resolved  Upper respiratory infection  Elevated troponin, likely demand ischemia from hypovolemia  Prolonged QTc  Alcohol use disorder  Generalized weakness    Addendum: Patient would like to return home with PCA services set up, discharge tomorrow AM    Follow-ups Needed After Discharge   Follow-up Appointments       Follow-up and recommended labs and tests       Follow up with primary care provider, [facility], within 7 days for hospital follow- up.  No follow up labs or test are needed.  - Follow up with a Cardiologist in 2-4 weeks for evaluation of heart disease              Hospital Course   Kriss Vergara is a 69 year old female with PMH CVA, COPD, tobacco use disorder, alcoholism currently undergoing treatment, hypertension, hyperlipidemia, CHB status post pacemaker who was admitted on 11/22/2024 with URI and acute kidney injury.  Presented with 4-5 days sore throat, cough, generalized weakness. Has been at [facility] for alcohol treatment. Seen in [facility] ED recently for the same but left before seeing the provider, she was negative for COVID at that time.  Denies fever or congestion.  Trevor indicated that he was too weak to reside there, she has needed help to go up the stairs and help to get out of a chair due to her generalized weakness.  Therefore they will not take her back.    ED workup noted for mild hyponatremia, hypokalemia, HUBER, hypomagnesemia. No leukocytosis.  Chest x-ray without acute pathology.  Strep negative. Due to concerns of injury, electrolyte abnormalities, generalized weakness inhibiting patient from returning back to provide, and elevated troponin with abnormal EKG the patient was admitted. Patient improved with supportive care. Her Antihypertensives were restarted  once her HUBER resolved. She is stable to discharge. Seen by PT who recommended back home with 5hrs of PCA services (pre-existing) or to TCU.    Elevated troponin, likely demand ischemia due to hypovolemia  Abnormal EKG, improving  Rule out ACS  Denies CP, notes some VARELA but not recent. EKG paced, new TWI inferior leads compared to EKG done 11/18 in care everywhere. Repeat EKG in ED remittent T wave inversions. Risk stratification: cardiac history, gender, age, smoking history, hyperlipidemia, diabetes. Echocardiogram 11/24 with EF 55-60%, LV and RV unremarkable, mild 1+ AR. LDL relatively low at 31  - Patient may benefit from outpatient ischemic workup such as stress test, follow up with Cardiology  - PTA amlodipine and losartan resumed  - Continue PTA aspirin 81 mg daily, amlodipine 10 mg daily, atorvastatin 20 mg daily    Prolonged QTc  528 ms on initial EKG with electrolyte disturbances as noted above.  Some improvement on repeat EKG. Improved to 488msec on 11/23, remains slightyl elevated 495 msec on 11/24  - Avoid QT prolonging meds  - Recheck EKG as outpatient    Alcohol use disorder  From [place] for treatment, they cannot take her back due to level of assistance she needs, generalized weakness. She notes her last alcoholic drink was 2 days prior.  She denies a history of DTs or alcohol withdrawal.  She reports she drinks 2 half pints of vodka per day regularly. No alcohol withdrawal noted here. Patient states she continues to stay sober following discharge.  - Recommended outpatient treatment    Benign essential hypertension  Complete heart block status post pacemaker 6/2024 at [facility]  - Continue PTA amlodipine and losartan    Chronic mild normocytic anemia  Baseline appears to be 10-11 range.  At baseline on admission.  No signs of active bleeding.    History of type 2 diabetes  Prediabetes  Last HbA1c of 5.4%, A1c is 6.4% this hospital stay  - Outpatient follow up    Clinically Significant Risk  "Factors             # Hypomagnesemia: Lowest Mg = 1.6 mg/dL in last 2 days, will replace as needed     # Thrombocytopenia: Lowest platelets = 106 in last 2 days, will monitor for bleeding   # Hypertension: Noted on problem list            # Overweight: Estimated body mass index is 25.51 kg/m  as calculated from the following:    Height as of this encounter: 1.702 m (5' 7\").    Weight as of this encounter: 73.9 kg (162 lb 14.4 oz)., PRESENT ON ADMISSION     # Financial/Environmental Concerns: none (denies)           Consultations This Hospital Stay   PHYSICAL THERAPY ADULT IP CONSULT  CARE MANAGEMENT / SOCIAL WORK IP CONSULT  CHEMICAL DEPENDENCY IP CONSULT  CHEMICAL DEPENDENCY IP CONSULT    Code Status   Full Code    Time Spent on this Encounter   I, Marcus Perez, personally saw the patient today and spent approximately 40 minutes discharging this patient.       Marcus Perez MD  [facility]  ______________________________________________________________________    Physical Exam   Vital Signs: Temp: 99  F (37.2  C) Temp src: Oral BP: (!) 160/96 Pulse: 88   Resp: 18 SpO2: 99 % O2 Device: None (Room air)    Weight: 162 lbs 14.4 oz    Constitutional: Female in NAD  HEENT: Eyes nonicteric, oral mucosa moist  Cardiovascular: RRR, normal S1/2, no m/r/g  Respiratory: CTAB, no wheezing or crackles  Vascular: Trace LE pitting edema  GI: Normoactive bowel sounds, nontender  Skin/Integumen: No rashes  Neuro/Psych: Appropriate affect and mood. A&Ox3, moves all extremities       Primary Care Physician   [facility]    Discharge Disposition   Discharged to home  Condition at discharge: Stable    Significant Results and Procedures   Most Recent 3 CBC's:  Recent Labs   Lab Test 11/24/24  2335 11/23/24  1212 11/22/24  1123   WBC  --  5.5 9.7   HGB  --  10.5* 10.5*   MCV  --  87 84   * 106* 123*     Most Recent 3 BMP's:  Recent Labs   Lab Test 11/25/24  0729 11/24/24  0637 " 24  1212 24  0107 24  1835   NA  --  136 137  --  134*   POTASSIUM 4.2 3.8 3.7  3.7   < > 2.7*   CHLORIDE  --  103 102  --  98   CO2  --     BUN  --  15.8 18.5  --  23.4*   CR  --  1.07* 1.23*  --  1.68*   ANIONGAP  --  9 11  --  14   ANIYA  --  8.9 9.1  --  8.4*   GLC  --  106* 106*  --  108*    < > = values in this interval not displayed.     Most Recent 2 LFT's:  Recent Labs   Lab Test 24  1212 24  1123   * 132*   ALT 50 49   ALKPHOS 98 91   BILITOTAL 0.3 0.5     Most Recent 3 INR's:No lab results found.  Most Recent 3 Troponin's:No lab results found.  Most Recent 3 BNP's:No lab results found.  Most Recent D-dimer:No lab results found.  Most Recent Cholesterol Panel:  Recent Labs   Lab Test 24  1212   CHOL 133   LDL 31   HDL 89   TRIG 65       Results for orders placed or performed during the hospital encounter of 24   XR Chest 2 Views    Narrative    XR CHEST 2 VIEWS   2024 11:33 AM     HISTORY: Cough.    COMPARISON: None.      Impression    IMPRESSION: No acute cardiopulmonary disease. Left chest pacer.    UNA STERN MD         SYSTEM ID:  [id number]   Echocardiogram Complete     Value    LVEF  55-60%    Narrative    [id number]  [id number]  [id number]  [id number]^ROBERTO^ABIGAIL^CARMELO     [facility]  Echocardiography Laboratory  [address] [place] [address]     Name: ANA MARIA BERMEO  MRN: [id number]  : 1955  Study Date: 2024 09:07 AM  Age: 69 yrs  Gender: Female  Patient Location: [facility]  Reason For Study: VARELA  Ordering Physician: ABIGAIL MEJIA  Referring Physician: [facility]  Performed By: Fred Bajwa RDCS     BSA: 1.8 m2  Height: 67 in  Weight: 162 lb  HR: 76  BP: 151/93 mmHg  ______________________________________________________________________________  Procedure  Complete Portable Echo Adult. Definity (NDC #60948-051) given  intravenously.  ______________________________________________________________________________  Interpretation Summary     The visual ejection fraction is 55-60%.  The left ventricle is normal in structure, function and size.  Septal motion is consistent with conduction abnormality.  The right ventricle is normal in structure, function and size.  There is mild (1+) aortic regurgitation.  There is no pericardial effusion.  ______________________________________________________________________________  Left Ventricle  The left ventricle is normal in structure, function and size. There is normal  left ventricular wall thickness. The visual ejection fraction is 55-60%.  Septal motion is consistent with conduction abnormality.     Right Ventricle  The right ventricle is normal in structure, function and size.     Atria  Normal left atrial size.     Mitral Valve  There is no mitral regurgitation noted.     Tricuspid Valve  There is trace tricuspid regurgitation.     Aortic Valve  There is mild (1+) aortic regurgitation.     Pulmonic Valve  The pulmonic valve is not well visualized.     Vessels  Normal size aorta. Normal size ascending aorta.     Pericardium  There is no pericardial effusion.     ______________________________________________________________________________  MMode/2D Measurements & Calculations  IVSd: 1.3 cm  LVIDd: 4.6 cm  LVIDs: 3.2 cm  LVPWd: 1.0 cm  FS: 30.7 %  LV mass(C)d: 197.5 grams  LV mass(C)dI: 106.8 grams/m2     Ao root diam: 3.3 cm  LA dimension: 3.4 cm  asc Aorta Diam: 3.6 cm  LA/Ao: 1.0  Ao root diam index Ht(cm/m): 1.9  Ao root diam index BSA (cm/m2): 1.8  Asc Ao diam index BSA (cm/m2): 2.0  Asc Ao diam index Ht(cm/m): 2.1  LA Volume (BP): 62.3 ml  LA Volume Index (BP): 33.7 ml/m2  RV Base: 3.1 cm     RWT: 0.45  TAPSE: 2.2 cm     Doppler Measurements & Calculations  MV E max apurva: 64.3 cm/sec  MV A max apurva: 112.3 cm/sec  MV E/A: 0.57  MV dec slope: 285.2 cm/sec2  MV dec time: 0.23 sec  AI  P1/2t: 471.9 msec  PA acc time: 0.12 sec  TR max marco a: 218.0 cm/sec  TR max P.0 mmHg  E/E' av.3  Lateral E/e': 13.1  Medial E/e': 13.4  RV S Marco A: 10.8 cm/sec     ______________________________________________________________________________  Report approved by: SOMMER Millard 2024 10:18 AM             Discharge Orders      Physical Therapy  Referral      Reason for your hospital stay    You were hospitalized for significant dehydration in the setting of an upper respiratory infection.     Follow-up and recommended labs and tests     Follow up with primary care provider, [facility], within 7 days for hospital follow- up.  No follow up labs or test are needed.  - Follow up with a Cardiologist in 2-4 weeks for evaluation of heart disease     Activity    Your activity upon discharge: activity as tolerated     Diet    Follow this diet upon discharge:   Regular Diet Adult     Discharge Medications   Current Discharge Medication List        CONTINUE these medications which have NOT CHANGED    Details   acetaminophen (TYLENOL) 325 MG tablet Take 325-650 mg by mouth every 4 hours as needed for mild pain.      albuterol (PROAIR HFA/PROVENTIL HFA/VENTOLIN HFA) 108 (90 Base) MCG/ACT inhaler Inhale 2 puffs into the lungs every 4 hours as needed for shortness of breath or wheezing.      amLODIPine (NORVASC) 10 MG tablet Take 10 mg by mouth daily.      aspirin (ASA) 81 MG chewable tablet Take 81 mg by mouth daily.      atorvastatin (LIPITOR) 20 MG tablet Take 20 mg by mouth daily.      benzonatate (TESSALON) 100 MG capsule Take 100-200 mg by mouth 3 times daily as needed for cough.      cyanocobalamin (VITAMIN B-12) 1000 MCG tablet Take 1,000 mcg by mouth daily.      folic acid (FOLVITE) 1 MG tablet Take 1 mg by mouth daily.      LORazepam (ATIVAN) 0.5 MG tablet Take 0.5-2 mg by mouth every 6 hours as needed for withdrawal.      losartan (COZAAR) 100 MG tablet Take 100 mg by  mouth daily.      meloxicam (MOBIC) 7.5 MG tablet Take 7.5 mg by mouth daily.      nicotine (NICORETTE) 2 MG gum Place 2 mg inside cheek every hour as needed for nicotine withdrawal symptoms.      Vitamin D3 (CHOLECALCIFEROL) 25 mcg (1000 units) tablet Take 1 tablet by mouth daily.           Allergies   Allergies   Allergen Reactions    Codeine Unknown

## 2024-11-25 NOTE — PLAN OF CARE
Goal Outcome Evaluation:    Summary: ETOH, hypokalemia, increased weakness    DATE & TIME: 11/24/24 [id number]   Cognitive Concerns/ Orientation: A&O x4   BEHAVIOR & AGGRESSION TOOL COLOR: Green  CIWA SCORE: NA  ABNL VS/O2: VSS on RA, except elevated BP  MOBILITY: Independent  PAIN MANAGMENT: C/O generalized/throat/ear pain, managed with PRN Oxy x1 & Tylenol x2   DIET: Regular  BOWEL/BLADDER: Cont. B&B. BM x1 this shift  ABNL LAB/BG: Cr 1.07; ; A1C 6.4;   DRAIN/DEVICES: R PIV SL  TELEMETRY RHYTHM: NA   SKIN: scattered scabs and bruises, reddened breast folds - Miconazole powder applied  TESTS/PROCEDURES: Echo completed today  D/C DAY/GOALS/PLACE: possibly back home tomorrow with help from PCA per CM note  OTHER IMPORTANT INFO: PT and chem dep consulted. Intermittent dry cough with no sputum and C/O sore throat - managed with cold liquids/foods and PRN Lozenges x3 this shift.

## 2024-11-25 NOTE — DISCHARGE SUMMARY
[facility]    Hospitalist Discharge Summary       Date of Admission:  11/22/2024  Date of Discharge:  11/25/2024  Discharging Provider: Marucs Perez MD      Discharge Diagnoses   HUBER, prerenal, resolved  Upper respiratory infection  Elevated troponin, likely demand ischemia from hypovolemia  Prolonged QTc  Alcohol use disorder  Generalized weakness    Follow-ups Needed After Discharge   Follow-up Appointments       Follow-up and recommended labs and tests       Follow up with primary care provider, [facility], within 7 days for hospital follow- up.  No follow up labs or test are needed.  - Follow up with a Cardiologist in 2-4 weeks for evaluation of heart disease              Hospital Course   Kriss Vergara is a 69 year old female with PMH CVA, COPD, tobacco use disorder, alcoholism currently undergoing treatment, hypertension, hyperlipidemia, CHB status post pacemaker who was admitted on 11/22/2024 with URI and acute kidney injury.  Presented with 4-5 days sore throat, cough, generalized weakness. Has been at [facility] for alcohol treatment. Seen in [facility] ED recently for the same but left before seeing the provider, she was negative for COVID at that time.  Denies fever or congestion.  [place] indicated that he was too weak to reside there, she has needed help to go up the stairs and help to get out of a chair due to her generalized weakness.  Therefore they will not take her back.    ED workup noted for mild hyponatremia, hypokalemia, HUBER, hypomagnesemia. No leukocytosis.  Chest x-ray without acute pathology.  Strep negative. Due to concerns of injury, electrolyte abnormalities, generalized weakness inhibiting patient from returning back to provide, and elevated troponin with abnormal EKG the patient was admitted. Patient improved with supportive care. Her Antihypertensives were restarted once her HUBER resolved. She is stable to discharge. Seen by  PT who recommended back home with 5hrs of PCA services (pre-existing).    Probable sinusitis  Patient reports increasing b/l frontal sinus pressure, right ear pain, from 11/24 to 11/25. Otoscope exam on 11/24 did not show a clearly swollen tymapnum but limited views due to ear wax. Given ongoing symptoms treat empirically with Augmentin x5 days.    Elevated troponin, likely demand ischemia due to hypovolemia  Abnormal EKG, improving  Rule out ACS  Denies CP, notes some VARELA but not recent. EKG paced, new TWI inferior leads compared to EKG done 11/18 in care everywhere. Repeat EKG in ED remittent T wave inversions. Risk stratification: cardiac history, gender, age, smoking history, hyperlipidemia, diabetes. Echocardiogram 11/24 with EF 55-60%, LV and RV unremarkable, mild 1+ AR. LDL relatively low at 31  - Patient may benefit from outpatient ischemic workup such as stress test, follow up with Cardiology  - PTA amlodipine and losartan resumed  - Continue PTA aspirin 81 mg daily, amlodipine 10 mg daily, atorvastatin 20 mg daily    Prolonged QTc, improved  528 ms on initial EKG with electrolyte disturbances as noted above.  Some improvement on repeat EKG. Improved to 488msec on 11/23, remains slightyl elevated 495 msec on 11/24, improved to 463 msec on 11/25.    Alcohol use disorder  From [place] for treatment, they cannot take her back due to level of assistance she needs, generalized weakness. She notes her last alcoholic drink was 2 days prior.  She denies a history of DTs or alcohol withdrawal.  She reports she drinks 2 half pints of vodka per day regularly. No alcohol withdrawal noted here. Patient states she continues to stay sober following discharge.  - Recommended outpatient treatment    Benign essential hypertension  Complete heart block status post pacemaker 6/2024 at [facility]  - Continue PTA amlodipine and losartan    Chronic mild normocytic anemia  Baseline appears to be 10-11 range.  At baseline on  "admission.  No signs of active bleeding.    History of type 2 diabetes  Prediabetes  Last HbA1c of 5.4%, A1c is 6.4% this hospital stay  - Outpatient follow up    Clinically Significant Risk Factors             # Hypomagnesemia: Lowest Mg = 1.6 mg/dL in last 2 days, will replace as needed     # Thrombocytopenia: Lowest platelets = 106 in last 2 days, will monitor for bleeding   # Hypertension: Noted on problem list            # Overweight: Estimated body mass index is 25.51 kg/m  as calculated from the following:    Height as of this encounter: 1.702 m (5' 7\").    Weight as of this encounter: 73.9 kg (162 lb 14.4 oz)., PRESENT ON ADMISSION     # Financial/Environmental Concerns: none (denies)           Consultations This Hospital Stay   PHYSICAL THERAPY ADULT IP CONSULT  CARE MANAGEMENT / SOCIAL WORK IP CONSULT  CHEMICAL DEPENDENCY IP CONSULT  CHEMICAL DEPENDENCY IP CONSULT    Code Status   Full Code    Time Spent on this Encounter   IMarcus, personally saw the patient today and spent approximately 20 minutes discharging this patient.       Marcus Perez MD  [facility]  ______________________________________________________________________    Physical Exam   Vital Signs: Temp: 99  F (37.2  C) Temp src: Oral BP: (!) 160/96 Pulse: 88   Resp: 18 SpO2: 99 % O2 Device: None (Room air)    Weight: 162 lbs 14.4 oz    Constitutional: Female in NAD  HEENT: Eyes nonicteric, oral mucosa moist  Cardiovascular: RRR, normal S1/2, no m/r/g  Respiratory: CTAB, no wheezing or crackles  Vascular: Trace LE pitting edema  GI: Normoactive bowel sounds, nontender  Skin/Integumen: No rashes  Neuro/Psych: Appropriate affect and mood. A&Ox3, moves all extremities       Primary Care Physician   [facility]    Discharge Disposition   Discharged to home  Condition at discharge: Stable    Significant Results and Procedures   Most Recent 3 CBC's:  Recent Labs   Lab Test 11/24/24  2335 " 24  1212 24  1123   WBC  --  5.5 9.7   HGB  --  10.5* 10.5*   MCV  --  87 84   * 106* 123*     Most Recent 3 BMP's:  Recent Labs   Lab Test 24  0729 24  0637 24  1212 24  0107 24  1835   NA  --  136 137  --  134*   POTASSIUM 4.2 3.8 3.7  3.7   < > 2.7*   CHLORIDE  --  103 102  --  98   CO2  --  24   -   BUN  --  15.8 18.5  --  23.4*   CR  --  1.07* 1.23*  --  1.68*   ANIONGAP  --  9 11  --  14   ANIYA  --  8.9 9.1  --  8.4*   GLC  --  106* 106*  --  108*    < > = values in this interval not displayed.     Most Recent 2 LFT's:  Recent Labs   Lab Test 24  1212 24  1123   * 132*   ALT 50 49   ALKPHOS 98 91   BILITOTAL 0.3 0.5     Most Recent 3 INR's:No lab results found.  Most Recent 3 Troponin's:No lab results found.  Most Recent 3 BNP's:No lab results found.  Most Recent D-dimer:No lab results found.  Most Recent Cholesterol Panel:  Recent Labs   Lab Test 24  1212   CHOL 133   LDL 31   HDL 89   TRIG 65       Results for orders placed or performed during the hospital encounter of 24   XR Chest 2 Views    Narrative    XR CHEST 2 VIEWS   2024 11:33 AM     HISTORY: Cough.    COMPARISON: None.      Impression    IMPRESSION: No acute cardiopulmonary disease. Left chest pacer.    UNA STERN MD         SYSTEM ID:  [id number]   Echocardiogram Complete     Value    LVEF  55-60%    Narrative    [id number]  [id number]  [id number]  [id number]^ROBERTO^ABIGAIL^CARMELO     [facility]  Echocardiography Laboratory  [address] [place] [id number]     Name: ANA MARIA BEMREO  MRN: [id number]  : 1955  Study Date: 2024 09:07 AM  Age: 69 yrs  Gender: Female  Patient Location: [facility]  Reason For Study: VARELA  Ordering Physician: ABIGAIL MEJIA  Referring Physician: [facility]  Performed By: Fred Bajwa RDCS     BSA: 1.8 m2  Height: 67 in  Weight: 162 lb  HR: 76  BP: 151/93  mmHg  ______________________________________________________________________________  Procedure  Complete Portable Echo Adult. Definity (NDC #70910-241) given intravenously.  ______________________________________________________________________________  Interpretation Summary     The visual ejection fraction is 55-60%.  The left ventricle is normal in structure, function and size.  Septal motion is consistent with conduction abnormality.  The right ventricle is normal in structure, function and size.  There is mild (1+) aortic regurgitation.  There is no pericardial effusion.  ______________________________________________________________________________  Left Ventricle  The left ventricle is normal in structure, function and size. There is normal  left ventricular wall thickness. The visual ejection fraction is 55-60%.  Septal motion is consistent with conduction abnormality.     Right Ventricle  The right ventricle is normal in structure, function and size.     Atria  Normal left atrial size.     Mitral Valve  There is no mitral regurgitation noted.     Tricuspid Valve  There is trace tricuspid regurgitation.     Aortic Valve  There is mild (1+) aortic regurgitation.     Pulmonic Valve  The pulmonic valve is not well visualized.     Vessels  Normal size aorta. Normal size ascending aorta.     Pericardium  There is no pericardial effusion.     ______________________________________________________________________________  MMode/2D Measurements & Calculations  IVSd: 1.3 cm  LVIDd: 4.6 cm  LVIDs: 3.2 cm  LVPWd: 1.0 cm  FS: 30.7 %  LV mass(C)d: 197.5 grams  LV mass(C)dI: 106.8 grams/m2     Ao root diam: 3.3 cm  LA dimension: 3.4 cm  asc Aorta Diam: 3.6 cm  LA/Ao: 1.0  Ao root diam index Ht(cm/m): 1.9  Ao root diam index BSA (cm/m2): 1.8  Asc Ao diam index BSA (cm/m2): 2.0  Asc Ao diam index Ht(cm/m): 2.1  LA Volume (BP): 62.3 ml  LA Volume Index (BP): 33.7 ml/m2  RV Base: 3.1 cm     RWT: 0.45  TAPSE: 2.2 cm      Doppler Measurements & Calculations  MV E max marco a: 64.3 cm/sec  MV A max marco a: 112.3 cm/sec  MV E/A: 0.57  MV dec slope: 285.2 cm/sec2  MV dec time: 0.23 sec  AI P1/2t: 471.9 msec  PA acc time: 0.12 sec  TR max marco a: 218.0 cm/sec  TR max P.0 mmHg  E/E' av.3  Lateral E/e': 13.1  Medial E/e': 13.4  RV S Marco A: 10.8 cm/sec     ______________________________________________________________________________  Report approved by: SOMMER Millard 2024 10:18 AM             Discharge Orders      Physical Therapy  Referral      Reason for your hospital stay    You were hospitalized for significant dehydration in the setting of an upper respiratory infection.     Follow-up and recommended labs and tests     Follow up with primary care provider, [facility], within 7 days for hospital follow- up.  No follow up labs or test are needed.  - Follow up with a Cardiologist in 2-4 weeks for evaluation of heart disease     Activity    Your activity upon discharge: activity as tolerated     Diet    Follow this diet upon discharge:   Regular Diet Adult     Discharge Medications   Current Discharge Medication List        START taking these medications    Details   amoxicillin-clavulanate (AUGMENTIN) 875-125 MG tablet Take 1 tablet by mouth every 12 hours for 5 days.  Qty: 10 tablet, Refills: 0    Associated Diagnoses: Acute frontal sinusitis, recurrence not specified           CONTINUE these medications which have NOT CHANGED    Details   acetaminophen (TYLENOL) 325 MG tablet Take 325-650 mg by mouth every 4 hours as needed for mild pain.      albuterol (PROAIR HFA/PROVENTIL HFA/VENTOLIN HFA) 108 (90 Base) MCG/ACT inhaler Inhale 2 puffs into the lungs every 4 hours as needed for shortness of breath or wheezing.      amLODIPine (NORVASC) 10 MG tablet Take 10 mg by mouth daily.      aspirin (ASA) 81 MG chewable tablet Take 81 mg by mouth daily.      atorvastatin (LIPITOR) 20 MG tablet Take 20  mg by mouth daily.      benzonatate (TESSALON) 100 MG capsule Take 100-200 mg by mouth 3 times daily as needed for cough.      cyanocobalamin (VITAMIN B-12) 1000 MCG tablet Take 1,000 mcg by mouth daily.      folic acid (FOLVITE) 1 MG tablet Take 1 mg by mouth daily.      LORazepam (ATIVAN) 0.5 MG tablet Take 0.5-2 mg by mouth every 6 hours as needed for withdrawal.      losartan (COZAAR) 100 MG tablet Take 100 mg by mouth daily.      meloxicam (MOBIC) 7.5 MG tablet Take 7.5 mg by mouth daily.      nicotine (NICORETTE) 2 MG gum Place 2 mg inside cheek every hour as needed for nicotine withdrawal symptoms.      Vitamin D3 (CHOLECALCIFEROL) 25 mcg (1000 units) tablet Take 1 tablet by mouth daily.           Allergies   Allergies   Allergen Reactions    Codeine Unknown

## 2024-11-25 NOTE — PLAN OF CARE
Physical Therapy Discharge Summary    Reason for therapy discharge:    Discharged to home with home therapy.    Progress towards therapy goal(s). See goals on Care Plan in [facility] electronic health record for goal details.  Goals partially met.  Barriers to achieving goals:   discharge from facility.    Therapy recommendation(s):    Continued therapy is recommended.  Rationale/Recommendations:  Per previous treating therapist: Pt appears near baseline level of function, anticipate she could discharge to home especially with PCA support 5 hours a day for more taxing household tasks. Pt does not feel she can return home at this time. If pt does not have assisst at home then may benefit from TCU stay to increase overall activity tolerance and independence..

## 2024-11-25 NOTE — PLAN OF CARE
Goal Outcome Evaluation:                           Goal Outcome Evaluation:     Summary: ETOH, hypokalemia, increased weakness     DATE & TIME: 11/25/24 1300    Cognitive Concerns/ Orientation: A&O x4   BEHAVIOR & AGGRESSION TOOL COLOR: Green  CIWA SCORE: NA  ABNL VS/O2: BP elevated, other VSS, on RA.  MOBILITY: Independent  PAIN MANAGMENT: C/O sore throat, given lozenges x2 which pt states helps.  DIET: Regular  BOWEL/BLADDER: Cont. B&B.  ABNL LAB/BG: WNL  DRAIN/DEVICES:Discontinued for discharge  TELEMETRY RHYTHM: NA   SKIN: scattered scabs and bruises, reddened breast folds - Miconazole powder applied  TESTS/PROCEDURES:NA  D/C DAY/GOALS/PLACE: Home today with help from PCA per CM note. Pt plans to  belongings and meds from [email or web address] today, son will transport her over there.  OTHER IMPORTANT INFO: Pt refusing CD intervention or to return to [email or web address]. Pt states she plans to stop drinking on her own now.

## 2024-11-27 ENCOUNTER — PATIENT OUTREACH (OUTPATIENT)
Dept: CARE COORDINATION | Facility: CLINIC | Age: 69
End: 2024-11-27
Payer: COMMERCIAL

## 2024-11-27 NOTE — PROGRESS NOTES
Connected Care Resource Center:   [facility] Contact  [facility]/Voicemail     Clinical Data: Post-Discharge Outreach     Outreach attempted x 2.Unable to leave message on patient's voicemail, providing [facility]'s central phone number of [phone] ([phone]) for questions/concerns and/or to schedule an appt with an [facility] provider, if they do not have a PCP.      Plan:  [facility] will do no further outreaches at this time.       CATERINA Montilla  Connected Care Resource [place], [facility]    *Connected Care Resource Team does NOT follow patient ongoing. Referrals are identified based on internal discharge reports and the outreach is to ensure patient has an understanding of their discharge instructions.

## 2025-06-17 ENCOUNTER — LAB REQUISITION (OUTPATIENT)
Dept: LAB | Facility: CLINIC | Age: 70
End: 2025-06-17
Payer: MEDICARE

## 2025-06-17 DIAGNOSIS — D69.6 THROMBOCYTOPENIA, UNSPECIFIED: ICD-10-CM

## 2025-06-17 DIAGNOSIS — F10.10 ALCOHOL ABUSE, UNCOMPLICATED: ICD-10-CM

## 2025-06-17 DIAGNOSIS — F33.1 MAJOR DEPRESSIVE DISORDER, RECURRENT, MODERATE (H): ICD-10-CM

## 2025-06-18 LAB
ALBUMIN SERPL BCG-MCNC: 3.2 G/DL (ref 3.5–5.2)
ALP SERPL-CCNC: 77 U/L (ref 40–150)
ALT SERPL W P-5'-P-CCNC: 79 U/L (ref 0–50)
ANION GAP SERPL CALCULATED.3IONS-SCNC: 11 MMOL/L (ref 7–15)
AST SERPL W P-5'-P-CCNC: 80 U/L (ref 0–45)
BILIRUB SERPL-MCNC: 0.4 MG/DL
BUN SERPL-MCNC: 15 MG/DL (ref 8–23)
CALCIUM SERPL-MCNC: 9.1 MG/DL (ref 8.8–10.4)
CHLORIDE SERPL-SCNC: 103 MMOL/L (ref 98–107)
CREAT SERPL-MCNC: 1.01 MG/DL (ref 0.51–0.95)
EGFRCR SERPLBLD CKD-EPI 2021: 60 ML/MIN/1.73M2
ERYTHROCYTE [DISTWIDTH] IN BLOOD BY AUTOMATED COUNT: 18.5 % (ref 10–15)
GLUCOSE SERPL-MCNC: 114 MG/DL (ref 70–99)
HCO3 SERPL-SCNC: 26 MMOL/L (ref 22–29)
HCT VFR BLD AUTO: 30.6 % (ref 35–47)
HGB BLD-MCNC: 9.9 G/DL (ref 11.7–15.7)
MCH RBC QN AUTO: 28.8 PG (ref 26.5–33)
MCHC RBC AUTO-ENTMCNC: 32.4 G/DL (ref 31.5–36.5)
MCV RBC AUTO: 89 FL (ref 78–100)
PLATELET # BLD AUTO: 155 10E3/UL (ref 150–450)
POTASSIUM SERPL-SCNC: 3.8 MMOL/L (ref 3.4–5.3)
PROT SERPL-MCNC: 5.9 G/DL (ref 6.4–8.3)
RBC # BLD AUTO: 3.44 10E6/UL (ref 3.8–5.2)
SODIUM SERPL-SCNC: 140 MMOL/L (ref 135–145)
WBC # BLD AUTO: 4.8 10E3/UL (ref 4–11)

## 2025-06-18 PROCEDURE — P9603 ONE-WAY ALLOW PRORATED MILES: HCPCS | Performed by: NURSE PRACTITIONER

## 2025-06-18 PROCEDURE — P9603 ONE-WAY ALLOW PRORATED MILES: HCPCS | Mod: ORL | Performed by: NURSE PRACTITIONER

## 2025-06-18 PROCEDURE — 85014 HEMATOCRIT: CPT | Performed by: NURSE PRACTITIONER

## 2025-06-18 PROCEDURE — 85027 COMPLETE CBC AUTOMATED: CPT | Mod: ORL | Performed by: NURSE PRACTITIONER

## 2025-06-18 PROCEDURE — 36415 COLL VENOUS BLD VENIPUNCTURE: CPT | Performed by: NURSE PRACTITIONER

## 2025-06-18 PROCEDURE — 80053 COMPREHEN METABOLIC PANEL: CPT | Mod: ORL | Performed by: NURSE PRACTITIONER

## 2025-06-18 PROCEDURE — 36415 COLL VENOUS BLD VENIPUNCTURE: CPT | Mod: ORL | Performed by: NURSE PRACTITIONER

## 2025-06-18 PROCEDURE — 82040 ASSAY OF SERUM ALBUMIN: CPT | Performed by: NURSE PRACTITIONER
